# Patient Record
Sex: FEMALE | Race: WHITE | NOT HISPANIC OR LATINO | Employment: OTHER | URBAN - METROPOLITAN AREA
[De-identification: names, ages, dates, MRNs, and addresses within clinical notes are randomized per-mention and may not be internally consistent; named-entity substitution may affect disease eponyms.]

---

## 2021-04-20 ENCOUNTER — TRANSCRIBE ORDERS (OUTPATIENT)
Dept: ADMINISTRATIVE | Facility: HOSPITAL | Age: 51
End: 2021-04-20

## 2021-04-20 DIAGNOSIS — R10.12 ABDOMINAL PAIN, LEFT UPPER QUADRANT: Primary | ICD-10-CM

## 2021-05-06 ENCOUNTER — HOSPITAL ENCOUNTER (OUTPATIENT)
Dept: ULTRASOUND IMAGING | Facility: HOSPITAL | Age: 51
Discharge: HOME | End: 2021-05-06
Admitting: NURSE PRACTITIONER

## 2021-05-06 DIAGNOSIS — R10.12 ABDOMINAL PAIN, LEFT UPPER QUADRANT: ICD-10-CM

## 2021-05-06 PROCEDURE — 76700 US EXAM ABDOM COMPLETE: CPT

## 2021-05-13 ENCOUNTER — OFFICE VISIT (OUTPATIENT)
Dept: OBSTETRICS AND GYNECOLOGY | Facility: CLINIC | Age: 51
End: 2021-05-13

## 2021-05-13 VITALS
BODY MASS INDEX: 37.15 KG/M2 | SYSTOLIC BLOOD PRESSURE: 130 MMHG | DIASTOLIC BLOOD PRESSURE: 88 MMHG | HEIGHT: 64 IN | WEIGHT: 217.6 LBS

## 2021-05-13 DIAGNOSIS — Z12.4 SCREENING FOR CERVICAL CANCER: ICD-10-CM

## 2021-05-13 DIAGNOSIS — R63.5 WEIGHT GAIN: ICD-10-CM

## 2021-05-13 DIAGNOSIS — R10.32 LEFT LOWER QUADRANT PAIN: ICD-10-CM

## 2021-05-13 DIAGNOSIS — N95.1 MENOPAUSAL SYMPTOMS: ICD-10-CM

## 2021-05-13 DIAGNOSIS — Z01.419 ENCOUNTER FOR GYNECOLOGICAL EXAMINATION WITHOUT ABNORMAL FINDING: Primary | ICD-10-CM

## 2021-05-13 DIAGNOSIS — Z12.31 ENCOUNTER FOR SCREENING MAMMOGRAM FOR MALIGNANT NEOPLASM OF BREAST: ICD-10-CM

## 2021-05-13 DIAGNOSIS — N92.1 MENORRHAGIA WITH IRREGULAR CYCLE: ICD-10-CM

## 2021-05-13 PROCEDURE — G0101 CA SCREEN;PELVIC/BREAST EXAM: HCPCS | Performed by: PHYSICIAN ASSISTANT

## 2021-05-13 RX ORDER — ERGOCALCIFEROL 1.25 MG/1
CAPSULE ORAL
COMMUNITY

## 2021-05-13 RX ORDER — METHOTREXATE 2.5 MG/1
5 TABLET ORAL WEEKLY
COMMUNITY
Start: 2021-02-25

## 2021-05-13 RX ORDER — CETIRIZINE HYDROCHLORIDE 10 MG/1
TABLET ORAL
COMMUNITY
End: 2021-06-30 | Stop reason: ALTCHOICE

## 2021-05-13 RX ORDER — ADALIMUMAB 40MG/0.8ML
40 KIT SUBCUTANEOUS
COMMUNITY

## 2021-05-13 RX ORDER — DIPHENOXYLATE HYDROCHLORIDE AND ATROPINE SULFATE 2.5; .025 MG/1; MG/1
1 TABLET ORAL DAILY
COMMUNITY

## 2021-05-13 RX ORDER — FOLIC ACID 1 MG/1
3 TABLET ORAL DAILY
COMMUNITY
Start: 2021-02-25

## 2021-05-13 NOTE — PROGRESS NOTES
Subjective   Chief Complaint   Patient presents with   • Gynecologic Exam     Last pap and MMG done 10 years ago, Patient would like to discuss Menopause       Abby Antoine is a 50 y.o. year old  presenting to be seen for her annual gynecological exam.   She is having menopausal symptoms. Hot flashes, night sweats and irregular menses for 1 year.   Reports intermittant left lower quadrant achy pain for 1 year also  LMP 21 for 8 days. 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    Past Medical History:   Diagnosis Date   • Abnormal Pap smear of cervix    • Anemia    • Anxiety    • Bipolar disorder (CMS/HCC)    • CHF (congestive heart failure) (CMS/HCC)    • Depression    • Gout    • Hyperlipidemia    • Kidney stone    • Liver disease    • Ovarian cyst    • PMS (premenstrual syndrome)    • Trauma    • Urinary tract infection         Current Outpatient Medications:   •  Adalimumab (Humira Pen) 40 MG/0.8ML Pen-injector Kit, 40 mg., Disp: , Rfl:   •  cetirizine (ZyrTEC Allergy) 10 MG tablet, Zyrtec 10 mg tablet  Daily, Disp: , Rfl:   •  ergocalciferol (ERGOCALCIFEROL) 1.25 MG (25116 UT) capsule, Vitamin D2 1,250 mcg (50,000 unit) capsule  TAKE 1 CAPSULE EVERY WEEK, Disp: , Rfl:   •  folic acid (FOLVITE) 1 MG tablet, , Disp: , Rfl:   •  methotrexate 2.5 MG tablet, , Disp: , Rfl:   •  multivitamin (Once Daily) tablet tablet, Take 1 tablet by mouth Daily., Disp: , Rfl:    Allergies   Allergen Reactions   • Latex Rash      Past Surgical History:   Procedure Laterality Date   • CERVIX LESION DESTRUCTION     • TUBAL ABDOMINAL LIGATION        Social History     Socioeconomic History   • Marital status:      Spouse name: Not on file   • Number of children: Not on file   • Years of education: Not on file  "  • Highest education level: Not on file   Tobacco Use   • Smoking status: Current Every Day Smoker   • Smokeless tobacco: Never Used   Vaping Use   • Vaping Use: Never used   Substance and Sexual Activity   • Alcohol use: Defer   • Drug use: Never   • Sexual activity: Yes     Partners: Male     Birth control/protection: Surgical      Family History   Problem Relation Age of Onset   • Coronary artery disease Father    • Hypertension Father    • Melanoma Father    • Stroke Maternal Grandmother    • Breast cancer Maternal Aunt    • Cervical cancer Maternal Aunt        Review of Systems   Constitutional: Positive for fatigue. Negative for chills, diaphoresis and fever.        Weight gain   Gastrointestinal: Negative.    Endocrine: Positive for heat intolerance.   Genitourinary: Positive for menstrual problem and pelvic pain. Negative for difficulty urinating and dysuria.   All other systems reviewed and are negative.          Objective   /88   Ht 162.6 cm (64\")   Wt 98.7 kg (217 lb 9.6 oz)   LMP 04/20/2021 (Exact Date)   Breastfeeding No   BMI 37.35 kg/m²     Physical Exam  Constitutional:       Appearance: Normal appearance. She is well-developed and well-groomed. She is obese.   Eyes:      General: Lids are normal.      Extraocular Movements: Extraocular movements intact.      Conjunctiva/sclera: Conjunctivae normal.   Chest:      Breasts: Breasts are symmetrical.         Right: No inverted nipple, mass, nipple discharge, skin change or tenderness.         Left: No inverted nipple, mass, nipple discharge, skin change or tenderness.   Abdominal:      Palpations: Abdomen is soft.      Tenderness: There is no abdominal tenderness.   Genitourinary:     Labia:         Right: No rash, tenderness or lesion.         Left: No rash, tenderness or lesion.       Urethra: No prolapse, urethral pain, urethral swelling or urethral lesion.      Vagina: No vaginal discharge, tenderness or lesions.      Cervix: No cervical " motion tenderness, discharge, friability or lesion.      Uterus: Not enlarged and not tender.       Adnexa:         Right: No mass or tenderness.          Left: No mass or tenderness.     Skin:     General: Skin is warm and dry.      Findings: No bruising or lesion.   Neurological:      General: No focal deficit present.      Mental Status: She is alert and oriented to person, place, and time.   Psychiatric:         Attention and Perception: Attention normal.         Mood and Affect: Mood normal.         Speech: Speech normal.         Behavior: Behavior is cooperative.            Result Review :{Labs  Result Review  Imaging  Med Tab  Media :23}                   Assessment and Plan  Diagnoses and all orders for this visit:    1. Encounter for gynecological examination without abnormal finding (Primary)    2. Screening for cervical cancer  -     Pap IG, Rfx HPV ASCU; Future    3. Encounter for screening mammogram for malignant neoplasm of breast  -     Mammo Screening Digital Tomosynthesis Bilateral With CAD; Future    4. Menopausal symptoms  -     Estradiol  -     Follicle Stimulating Hormone  -     Luteinizing Hormone  -     Progesterone    5. Weight gain    6. Menorrhagia with irregular cycle  -     US Non-ob Transvaginal    7. Left lower quadrant pain  -     US Non-ob Transvaginal      Patient Instructions   Self breast exam monthly  Annual mammogram  Regular excercise  Will check hormone levels today  Request labs from PCP  RTO for pelvic ultrasound             This note was electronically signed.    Sana Canas PA-C   May 13, 2021

## 2021-05-13 NOTE — PATIENT INSTRUCTIONS
Self breast exam monthly  Annual mammogram  Regular excercise  Will check hormone levels today  Request labs from PCP  RTO for pelvic ultrasound

## 2021-05-14 LAB
ESTRADIOL SERPL-MCNC: 28 PG/ML
FSH SERPL-ACNC: 59.5 MIU/ML
LH SERPL-ACNC: 39.4 MIU/ML
PROGEST SERPL-MCNC: <0.1 NG/ML

## 2021-05-21 DIAGNOSIS — Z12.4 SCREENING FOR CERVICAL CANCER: ICD-10-CM

## 2021-06-08 ENCOUNTER — DOCUMENTATION (OUTPATIENT)
Dept: OBSTETRICS AND GYNECOLOGY | Facility: CLINIC | Age: 51
End: 2021-06-08

## 2021-06-30 ENCOUNTER — OFFICE VISIT (OUTPATIENT)
Dept: GASTROENTEROLOGY | Facility: CLINIC | Age: 51
End: 2021-06-30

## 2021-06-30 VITALS
TEMPERATURE: 97.8 F | DIASTOLIC BLOOD PRESSURE: 89 MMHG | WEIGHT: 210 LBS | HEIGHT: 64 IN | HEART RATE: 76 BPM | BODY MASS INDEX: 35.85 KG/M2 | SYSTOLIC BLOOD PRESSURE: 131 MMHG

## 2021-06-30 DIAGNOSIS — E78.2 ELEVATED TRIGLYCERIDES WITH HIGH CHOLESTEROL: Chronic | ICD-10-CM

## 2021-06-30 DIAGNOSIS — K76.0 FATTY (CHANGE OF) LIVER, NOT ELSEWHERE CLASSIFIED: Primary | Chronic | ICD-10-CM

## 2021-06-30 DIAGNOSIS — E66.01 CLASS 2 SEVERE OBESITY DUE TO EXCESS CALORIES WITH SERIOUS COMORBIDITY AND BODY MASS INDEX (BMI) OF 36.0 TO 36.9 IN ADULT (HCC): Chronic | ICD-10-CM

## 2021-06-30 DIAGNOSIS — K21.9 GASTROESOPHAGEAL REFLUX DISEASE WITHOUT ESOPHAGITIS: Chronic | ICD-10-CM

## 2021-06-30 DIAGNOSIS — R10.9 LEFT FLANK PAIN: ICD-10-CM

## 2021-06-30 DIAGNOSIS — K80.20 CALCULUS OF GALLBLADDER WITHOUT CHOLECYSTITIS WITHOUT OBSTRUCTION: Chronic | ICD-10-CM

## 2021-06-30 DIAGNOSIS — Z92.21 HISTORY OF METHOTREXATE THERAPY: Chronic | ICD-10-CM

## 2021-06-30 DIAGNOSIS — L29.9 PRURITUS: Chronic | ICD-10-CM

## 2021-06-30 PROBLEM — F17.200 TOBACCO DEPENDENCE SYNDROME: Status: ACTIVE | Noted: 2018-05-03

## 2021-06-30 PROBLEM — B19.20 VIRAL HEPATITIS C: Status: ACTIVE | Noted: 2018-05-03

## 2021-06-30 PROBLEM — R09.82 ALLERGIC RHINITIS WITH POSTNASAL DRIP: Status: ACTIVE | Noted: 2018-10-13

## 2021-06-30 PROBLEM — B19.20 VIRAL HEPATITIS C: Status: RESOLVED | Noted: 2018-05-03 | Resolved: 2021-06-30

## 2021-06-30 PROBLEM — R60.9 SWELLING: Status: ACTIVE | Noted: 2018-05-03

## 2021-06-30 PROBLEM — E66.812 CLASS 2 SEVERE OBESITY DUE TO EXCESS CALORIES WITH SERIOUS COMORBIDITY AND BODY MASS INDEX (BMI) OF 36.0 TO 36.9 IN ADULT: Status: ACTIVE | Noted: 2021-06-30

## 2021-06-30 PROBLEM — Z87.39 HISTORY OF RHEUMATOID ARTHRITIS: Status: ACTIVE | Noted: 2018-10-13

## 2021-06-30 PROBLEM — J30.9 ALLERGIC RHINITIS WITH POSTNASAL DRIP: Status: ACTIVE | Noted: 2018-10-13

## 2021-06-30 PROBLEM — Z87.01 HISTORY OF PNEUMONIA: Status: ACTIVE | Noted: 2018-10-13

## 2021-06-30 PROCEDURE — 99204 OFFICE O/P NEW MOD 45 MIN: CPT | Performed by: NURSE PRACTITIONER

## 2021-06-30 RX ORDER — LORATADINE 10 MG/1
10 TABLET ORAL DAILY
COMMUNITY

## 2021-06-30 NOTE — PROGRESS NOTES
"     New Patient Consult      Date: 2021   Patient Name: Abby Antoine  MRN: [id number]  : 1970     Primary Care Provider: Abby Manning APRN    Chief Complaint   Patient presents with   • Liver Eval     Fatty liver     History of Present Illness: Abby Antoine is a 50 y.o. female who is here today to establish care with Gastroenterology for fatty liver.    Patient recently had ultrasound and was told to have fatty liver. She is not aware of liver disease in the past. No history of elevated liver enzymes. She has RA and has labs drawn every 3 months by rheumatology as she is on Methotrexate. She admits she has gained weight, about 60 pounds, over the past year, but is trying to lose it. For the past 6 months she has been having itching \"all over\" that is worse at night when she goes to bed. No history of rashes. No history of jaundice or pale stools. Her uncle has a history of cirrhosis that was alcohol induced.     She has pain in her left side that is gradually improving, it feels like a \"catch\" when she moves certain ways. No abdominal pain. No change in bowel habits. She has a long standing history of loose stools that will come and go.     She has occasional reflux that will come and go and is mild to moderate. She is not taking anything for reflux. No difficulty swallowing. No nausea or vomiting.     Her last colonoscopy and EGD was  that was \"normal\" according to patient. No family history of GI malignancy.     Subjective      Past Medical History:   Diagnosis Date   • Abnormal Pap smear of cervix    • Acid reflux    • Anemia    • Anxiety    • Back pain    • Bipolar disorder (CMS/HCC)    • CHF (congestive heart failure) (CMS/HCC) 2017   • Deafness    • Depression    • Fibromyalgia    • Folic acid deficiency    • Gout    • Headache    • History of blood transfusion     at birth   • HTN (hypertension)    • Hyperlipidemia    • Insomnia    • Iron deficiency    • Kidney stone    • Liver " disease    • Ovarian cyst    • Panic attack    • PMS (premenstrual syndrome)    • RA (rheumatoid arthritis) (CMS/HCC)    • RLS (restless legs syndrome)    • Seasonal allergies    • Sinus problem    • Sleep apnea    • Tattoos    • Trauma    • Urinary tract infection    • Vision problems    • Vitamin B12 deficiency      Past Surgical History:   Procedure Laterality Date   • CERVIX LESION DESTRUCTION     • COLONOSCOPY  8- years or so    [facility]   • TUBAL ABDOMINAL LIGATION  1994     Family History   Problem Relation Age of Onset   • Coronary artery disease Father    • Hypertension Father    • Melanoma Father    • Stroke Maternal Grandmother    • Breast cancer Maternal Aunt    • Cervical cancer Maternal Aunt    • Liver cancer Paternal Aunt    • Inflammatory bowel disease Paternal Aunt    • Liver cancer Paternal Uncle    • Liver cancer Paternal Grandfather    • Cirrhosis Maternal Uncle         alcohol induced   • Colon cancer Neg Hx      Social History     Socioeconomic History   • Marital status:      Spouse name: Not on file   • Number of children: Not on file   • Years of education: Not on file   • Highest education level: Not on file   Tobacco Use   • Smoking status: Current Every Day Smoker     Packs/day: 0.25     Types: Cigarettes     Start date: 1997   • Smokeless tobacco: Never Used   Vaping Use   • Vaping Use: Never used   Substance and Sexual Activity   • Alcohol use: Yes     Comment: social   • Drug use: Never   • Sexual activity: Defer       Current Outpatient Medications:   •  Adalimumab (Humira Pen) 40 MG/0.8ML Pen-injector Kit, 40 mg., Disp: , Rfl:   •  ergocalciferol (ERGOCALCIFEROL) 1.25 MG (02799 UT) capsule, Vitamin D2 1,250 mcg (50,000 unit) capsule  TAKE 1 CAPSULE EVERY WEEK, Disp: , Rfl:   •  Fluconazole (DIFLUCAN PO), Take  by mouth 1 (One) Time., Disp: , Rfl:   •  folic acid (FOLVITE) 1 MG tablet, Take 3 mg by mouth Daily., Disp: , Rfl:   •  loratadine (Claritin) 10 MG  tablet, Take 10 mg by mouth Daily., Disp: , Rfl:   •  methotrexate 2.5 MG tablet, Take 5 mg by mouth 1 (One) Time Per Week., Disp: , Rfl:   •  multivitamin (Once Daily) tablet tablet, Take 1 tablet by mouth Daily., Disp: , Rfl:     Allergies   Allergen Reactions   • Latex Rash     The following portions of the patient's history were reviewed and updated as appropriate: allergies, current medications, past family history, past medical history, past social history, past surgical history and problem list.    Objective     Physical Exam  Vitals and nursing note reviewed.   Constitutional:       General: She is not in acute distress.     Appearance: Normal appearance. She is well-developed. She is not diaphoretic.   HENT:      Head: Normocephalic and atraumatic.      Right Ear: Hearing and external ear normal.      Left Ear: Hearing and external ear normal.      Nose: Nose normal.      Mouth/Throat:      Mouth: Mucous membranes are not pale, not dry and not cyanotic.   Eyes:      General: Lids are normal.         Right eye: No discharge.         Left eye: No discharge.      Conjunctiva/sclera: Conjunctivae normal.   Neck:      Trachea: Trachea normal.   Cardiovascular:      Rate and Rhythm: Normal rate and regular rhythm.      Heart sounds: Normal heart sounds.   Pulmonary:      Effort: Pulmonary effort is normal. No respiratory distress.      Breath sounds: Normal breath sounds.   Chest:      Chest wall: No tenderness.       Abdominal:      General: Bowel sounds are normal. There is no distension.      Palpations: Abdomen is soft. There is no mass.      Tenderness: There is no abdominal tenderness. There is no guarding or rebound.      Hernia: No hernia is present.   Musculoskeletal:      Cervical back: No edema.   Skin:     General: Skin is warm and dry.      Coloration: Skin is not pale.      Findings: No rash.   Neurological:      Mental Status: She is alert and oriented to person, place, and time.      Cranial  "Nerves: No cranial nerve deficit.   Psychiatric:         Mood and Affect: Mood normal.         Speech: Speech normal.         Behavior: Behavior is cooperative.       Vitals:    06/30/21 1411   BP: 131/89   Pulse: 76   Temp: 97.8 °F (36.6 °C)   TempSrc: Infrared   Weight: 95.3 kg (210 lb)   Height: 162.6 cm (64\")     Results Review:   I have reviewed the patient's new clinical and imaging results.    Office Visit on 05/13/2021   Component Date Value Ref Range Status   • Estradiol 05/13/2021 28.0  pg/mL Final    Comment:                     Adult Female:                        Follicular phase   12.5 -   166.0                        Ovulation phase    85.8 -   498.0                        Luteal phase       43.8 -   211.0                        Postmenopausal     <6.0 -    54.7                      Pregnancy                        1st trimester     215.0 - >4300.0  Roche ECLIA methodology     • FSH 05/13/2021 59.5  mIU/mL Final    Comment:                     Adult Female:                        Follicular phase      3.5 -  12.5                        Ovulation phase       4.7 -  21.5                        Luteal phase          1.7 -   7.7                        Postmenopausal       25.8 - 134.8     • LH 05/13/2021 39.4  mIU/mL Final    Comment:                     Adult Female:                        Follicular phase      2.4 -  12.6                        Ovulation phase      14.0 -  95.6                        Luteal phase          1.0 -  11.4                        Postmenopausal        7.7 -  58.5     • Progesterone 05/13/2021 <0.1  ng/mL Final    Comment:                      Follicular phase       0.1 -   0.9                       Luteal phase           1.8 -  23.9                       Ovulation phase        0.1 -  12.0                       Pregnant                          First trimester    11.0 -  44.3                          Second trimester   25.4 -  83.3                          Third trimester    " 58.7 - 214.0                       Postmenopausal         0.0 -   0.1        US Abdomen Complete      Result Date: 5/6/2021  1. Cholelithiasis. 2. Fatty infiltration of the liver. 3. Right kidney not visualized.      Images were reviewed, interpreted, and dictated by Dr. Tracy Gillette M.D. Transcribed by Jolie Sultana PA-C.  This report was finalized on 5/6/2021 3:01 PM by Tracy Gillette M.D..     Dated 4/1/2021 albumin 4.0 alkaline phosphatase 117 ALT 18 AST 24 total bilirubin 0.3, lipase 21, amylase 75, TSH 1.570, triglycerides 370, total cholesterol 261, , HDL 47    Assessment / Plan      1. Fatty (change of) liver, not elsewhere classified  2. Class 2 severe obesity due to excess calories with serious comorbidity and body mass index (BMI) of 36.0 to 36.9 in adult (CMS/HCC)  3. Elevated triglycerides with high cholesterol  4. Pruritus  5. History of methotrexate therapy  Patient recently had abdominal ultrasound with incidental finding of fatty infiltration of liver noted.  No history of elevated liver enzymes.  No personal or family history of liver disease in the past.  Her uncle has a history of cirrhosis that was alcohol induced.  According to records, triglycerides are elevated at 370, total cholesterol at 261, , HDL 47.  She has a history of pruritus for the past 6 months, no rash.  No history of jaundice or pale stools.  She is on methotrexate for RA. She has gained 60 pounds over the past year unintentionally.  High-fiber, low-fat diet with liberal water intake  Exercise, weight reduction  Labs  Referral to nutrition services    - CBC (No Diff); Future  - Comprehensive Metabolic Panel; Future  - Hepatitis A Antibody, Total; Future  - Hepatitis B Surf Antibody Quant; Future  - Hepatitis B Surface Antigen; Future  - Hepatitis B Core Antibody, Total; Future  - Hepatitis C Antibody; Future  - Antinuclear Antibodies Direct; Future  - Mitochondrial Antibodies, M2; Future  - Anti-Smooth  Muscle Antibody Titer; Future  - FRANCISCO Fibrosure; Future  - Ambulatory Referral to Nutrition Services    6. Gastroesophageal reflux disease without esophagitis  History of occasional reflux that comes and goes that is mild to moderate depending on her diet.  She is not taking anything for reflux at this time as it is not severe.  No difficulty swallowing.  Antireflux measures.  May take over-the-counter antacids as needed    7. Calculus of gallbladder without cholecystitis without obstruction  Incidental finding of gallstones on recent ultrasound.  Patient states she has had a history of gallstones for several years.  There is no history of abdominal pain, nausea or vomiting.  She has occasional loose stools, no rectal bleeding.  Will monitor at this time as patient is not symptomatic.    8. Left flank pain  She has a history of pain in her left side that is gradually improving.  It feels like something is catching when she moves in certain positions.  No abdominal pain.  No nausea or vomiting.  No hematuria. Tenderness noted over lower left ribs on the side. TSH normal. Lipase normal.  Etiology unclear.  May need further evaluation if no improvement.    Patient Instructions   High fiber, low fat diet with liberal water intake.   Advised to exercise 30 minutes 4-5 days per week.  Advised to lose 20 pounds in the next 6-12 months.  Antireflux measures: Avoid fried, fatty foods, alcohol, chocolate, coffee, tea,  soft drinks, peppermint and spearmint, spicy foods, tomatoes and tomato based foods, onion based foods, and smoking. Other antireflux measures include weight reduction if overweight, avoiding tight clothing around the abdomen, elevating the head of the bed 6 inches with blocks under the head board, and don't drink or eat before going to bed and avoid lying down immediately after meals.  May take over the counter antacids as needed.  Labs  Referral to nutrition services.   Follow up: 3 months  Mediterranean  Diet  A Mediterranean diet refers to food and lifestyle choices that are based on the traditions of countries located on the [place]. This way of eating has been shown to help prevent certain conditions and improve outcomes for people who have chronic diseases, like kidney disease and heart disease.  What are tips for following this plan?  Lifestyle  · Cook and eat meals together with your family, when possible.  · Drink enough fluid to keep your urine clear or pale yellow.  · Be physically active every day. This includes:  ? Aerobic exercise like running or swimming.  ? Leisure activities like gardening, walking, or housework.  · Get 7-8 hours of sleep each night.  Reading food labels    · Check the serving size of packaged foods. For foods such as rice and pasta, the serving size refers to the amount of cooked product, not dry.  · Check the total fat in packaged foods. Avoid foods that have saturated fat or trans fats.  · Check the ingredients list for added sugars, such as corn syrup.  Shopping  · At the grocery store, buy most of your food from the areas near the walls of the store. This includes:  ? Fresh fruits and vegetables (produce).  ? Grains, beans, nuts, and seeds. Some of these may be available in unpackaged forms or large amounts (in bulk).  ? Fresh seafood.  ? Poultry and eggs.  ? Low-fat dairy products.  · Buy whole ingredients instead of prepackaged foods.  · Buy fresh fruits and vegetables in-season from local farmers markets.  · Buy frozen fruits and vegetables in resealable bags.  · If you do not have access to quality fresh seafood, buy precooked frozen shrimp or canned fish, such as tuna, salmon, or sardines.  · Buy small amounts of raw or cooked vegetables, salads, or olives from the deli or salad bar at your store.  · Stock your pantry so you always have certain foods on hand, such as olive oil, canned tuna, canned tomatoes, rice, pasta, and beans.  Cooking  · Cook foods with  extra-virgin olive oil instead of using butter or other vegetable oils.  · Have meat as a side dish, and have vegetables or grains as your main dish. This means having meat in small portions or adding small amounts of meat to foods like pasta or stew.  · Use beans or vegetables instead of meat in common dishes like chili or lasagna.  · [place] with different cooking methods. Try roasting or broiling vegetables instead of steaming or sautéeing them.  · Add frozen vegetables to soups, stews, pasta, or rice.  · Add nuts or seeds for added healthy fat at each meal. You can add these to yogurt, salads, or vegetable dishes.  · Marinate fish or vegetables using olive oil, lemon juice, garlic, and fresh herbs.  Meal planning    · Plan to eat 1 vegetarian meal one day each week. Try to work up to 2 vegetarian meals, if possible.  · Eat seafood 2 or more times a week.  · Have healthy snacks readily available, such as:  ? Vegetable sticks with hummus.  ? Greek yogurt.  ? Fruit and nut trail mix.  · Eat balanced meals throughout the week. This includes:  ? Fruit: 2-3 servings a day  ? Vegetables: 4-5 servings a day  ? Low-fat dairy: 2 servings a day  ? Fish, poultry, or lean meat: 1 serving a day  ? Beans and legumes: 2 or more servings a week  ? Nuts and seeds: 1-2 servings a day  ? Whole grains: 6-8 servings a day  ? Extra-virgin olive oil: 3-4 servings a day  · Limit red meat and sweets to only a few servings a month  What are my food choices?  · Mediterranean diet  ? Recommended  § Grains: Whole-grain pasta. Brown rice. Bulgar wheat. Polenta. Couscous. Whole-wheat bread. Oatmeal. Quinoa.  § Vegetables: Artichokes. Beets. Broccoli. Cabbage. Carrots. Eggplant. Green beans. Chard. Kale. Spinach. Onions. Leeks. Peas. Squash. Tomatoes. Peppers. Radishes.  § Fruits: Apples. Apricots. Avocado. Berries. Bananas. Cherries. Dates. Figs. Grapes. Ronal[place]. Melon. Oranges. Peaches. Plums. Pomegranate.  § Meats and other protein foods:  Beans. Almonds. Sunflower seeds. Pine nuts. Peanuts. Cod. [place]. Scallops. Shrimp. Tuna. Tilapia. Clams. Oysters. Eggs.  § Dairy: Low-fat milk. Cheese. Greek yogurt.  § Beverages: Water. Red wine. Herbal tea.  § Fats and oils: Extra virgin olive oil. Avocado oil. Grape seed oil.  § Sweets and desserts: Greek yogurt with honey. Baked apples. Poached pears. Trail mix.  § Seasoning and other foods: Basil. Cilantro. Coriander. Cumin. Mint. Parsley. Andre. Rosemary. Tarragon. Garlic. Oregano. Thyme. Pepper. Balsalmic vinegar. Tahini. Hummus. Tomato sauce. Olives. Mushrooms.  ? Limit these  § Grains: Prepackaged pasta or rice dishes. Prepackaged cereal with added sugar.  § Vegetables: Deep fried potatoes (french fries).  § Fruits: Fruit canned in syrup.  § Meats and other protein foods: Beef. Pork. Lamb. Poultry with skin. Hot dogs. [place].  § Dairy: Ice cream. Sour cream. Whole milk.  § Beverages: Juice. Sugar-sweetened soft drinks. Beer. Liquor and spirits.  § Fats and oils: Butter. Canola oil. Vegetable oil. Beef fat (tallow). Lard.  § Sweets and desserts: Cookies. Cakes. Pies. Candy.  § Seasoning and other foods: Mayonnaise. Premade sauces and marinades.  The items listed may not be a complete list. Talk with your dietitian about what dietary choices are right for you.  Summary  · The Mediterranean diet includes both food and lifestyle choices.  · Eat a variety of fresh fruits and vegetables, beans, nuts, seeds, and whole grains.  · Limit the amount of red meat and sweets that you eat.  This information is not intended to replace advice given to you by your health care provider. Make sure you discuss any questions you have with your health care provider.  Document Revised: 08/17/2017 Document Reviewed: 08/10/2017  [employer] Patient Education © 2020 [employer]     Brayan Duran, APRN  6/30/2021    Please note that portions of this note may have been completed with a voice recognition program. Efforts were made to  edit the dictations, but occasionally words are mistranscribed.

## 2021-06-30 NOTE — PATIENT INSTRUCTIONS
High fiber, low fat diet with liberal water intake.   Advised to exercise 30 minutes 4-5 days per week.  Advised to lose 20 pounds in the next 6-12 months.  Antireflux measures: Avoid fried, fatty foods, alcohol, chocolate, coffee, tea,  soft drinks, peppermint and spearmint, spicy foods, tomatoes and tomato based foods, onion based foods, and smoking. Other antireflux measures include weight reduction if overweight, avoiding tight clothing around the abdomen, elevating the head of the bed 6 inches with blocks under the head board, and don't drink or eat before going to bed and avoid lying down immediately after meals.  May take over the counter antacids as needed.  Labs  Referral to nutrition services.   Follow up: 3 months  Mediterranean Diet  A Mediterranean diet refers to food and lifestyle choices that are based on the traditions of countries located on the [place]. This way of eating has been shown to help prevent certain conditions and improve outcomes for people who have chronic diseases, like kidney disease and heart disease.  What are tips for following this plan?  Lifestyle  · Cook and eat meals together with your family, when possible.  · Drink enough fluid to keep your urine clear or pale yellow.  · Be physically active every day. This includes:  ? Aerobic exercise like running or swimming.  ? Leisure activities like gardening, walking, or housework.  · Get 7-8 hours of sleep each night.  Reading food labels    · Check the serving size of packaged foods. For foods such as rice and pasta, the serving size refers to the amount of cooked product, not dry.  · Check the total fat in packaged foods. Avoid foods that have saturated fat or trans fats.  · Check the ingredients list for added sugars, such as corn syrup.  Shopping  · At the grocery store, buy most of your food from the areas near the walls of the store. This includes:  ? Fresh fruits and vegetables (produce).  ? Grains, beans, nuts, and  seeds. Some of these may be available in unpackaged forms or large amounts (in bulk).  ? Fresh seafood.  ? Poultry and eggs.  ? Low-fat dairy products.  · Buy whole ingredients instead of prepackaged foods.  · Buy fresh fruits and vegetables in-season from local farmers markets.  · Buy frozen fruits and vegetables in resealable bags.  · If you do not have access to quality fresh seafood, buy precooked frozen shrimp or canned fish, such as tuna, salmon, or sardines.  · Buy small amounts of raw or cooked vegetables, salads, or olives from the deli or salad bar at your store.  · Stock your pantry so you always have certain foods on hand, such as olive oil, canned tuna, canned tomatoes, rice, pasta, and beans.  Cooking  · Cook foods with extra-virgin olive oil instead of using butter or other vegetable oils.  · Have meat as a side dish, and have vegetables or grains as your main dish. This means having meat in small portions or adding small amounts of meat to foods like pasta or stew.  · Use beans or vegetables instead of meat in common dishes like chili or lasagna.  · [place] with different cooking methods. Try roasting or broiling vegetables instead of steaming or sautéeing them.  · Add frozen vegetables to soups, stews, pasta, or rice.  · Add nuts or seeds for added healthy fat at each meal. You can add these to yogurt, salads, or vegetable dishes.  · Marinate fish or vegetables using olive oil, lemon juice, garlic, and fresh herbs.  Meal planning    · Plan to eat 1 vegetarian meal one day each week. Try to work up to 2 vegetarian meals, if possible.  · Eat seafood 2 or more times a week.  · Have healthy snacks readily available, such as:  ? Vegetable sticks with hummus.  ? Greek yogurt.  ? Fruit and nut trail mix.  · Eat balanced meals throughout the week. This includes:  ? Fruit: 2-3 servings a day  ? Vegetables: 4-5 servings a day  ? Low-fat dairy: 2 servings a day  ? Fish, poultry, or lean meat: 1 serving a  day  ? Beans and legumes: 2 or more servings a week  ? Nuts and seeds: 1-2 servings a day  ? Whole grains: 6-8 servings a day  ? Extra-virgin olive oil: 3-4 servings a day  · Limit red meat and sweets to only a few servings a month  What are my food choices?  · Mediterranean diet  ? Recommended  § Grains: Whole-grain pasta. Brown rice. Bulgar wheat. Polenta. Couscous. Whole-wheat bread. Oatmeal. Quinoa.  § Vegetables: Artichokes. Beets. Broccoli. Cabbage. Carrots. Eggplant. Green beans. Chard. Kale. Spinach. Onions. Leeks. Peas. Squash. Tomatoes. Peppers. Radishes.  § Fruits: Apples. Apricots. Avocado. Berries. Bananas. Cherries. Dates. Figs. Grapes. Ronaldo. Melon. Oranges. Peaches. Plums. Pomegranate.  § Meats and other protein foods: Beans. Almonds. Sunflower seeds. Pine nuts. Peanuts. Cod. [place]. Scallops. Shrimp. Tuna. Tilapia. Clams. Oysters. Eggs.  § Dairy: Low-fat milk. Cheese. Greek yogurt.  § Beverages: Water. Red wine. Herbal tea.  § Fats and oils: Extra virgin olive oil. Avocado oil. Grape seed oil.  § Sweets and desserts: Greek yogurt with honey. Baked apples. Poached pears. Trail mix.  § Seasoning and other foods: Basil. Cilantro. Coriander. Cumin. Mint. Parsley. Andre. Rosemary. Tarragon. Garlic. Oregano. Thyme. Pepper. Balsalmic vinegar. Tahini. Hummus. Tomato sauce. Olives. Mushrooms.  ? Limit these  § Grains: Prepackaged pasta or rice dishes. Prepackaged cereal with added sugar.  § Vegetables: Deep fried potatoes (french fries).  § Fruits: Fruit canned in syrup.  § Meats and other protein foods: Beef. Pork. Lamb. Poultry with skin. Hot dogs. Portillo.  § Dairy: Ice cream. Sour cream. Whole milk.  § Beverages: Juice. Sugar-sweetened soft drinks. Beer. Liquor and spirits.  § Fats and oils: Butter. Canola oil. Vegetable oil. Beef fat (tallow). Lard.  § Sweets and desserts: Cookies. Cakes. Pies. Candy.  § Seasoning and other foods: Mayonnaise. Premade sauces and marinades.  The items listed may not be a  complete list. Talk with your dietitian about what dietary choices are right for you.  Summary  · The Mediterranean diet includes both food and lifestyle choices.  · Eat a variety of fresh fruits and vegetables, beans, nuts, seeds, and whole grains.  · Limit the amount of red meat and sweets that you eat.  This information is not intended to replace advice given to you by your health care provider. Make sure you discuss any questions you have with your health care provider.  Document Revised: 08/17/2017 Document Reviewed: 08/10/2017  Elsevier Patient Education © 2020 Elsevier Inc.

## 2021-09-17 ENCOUNTER — TELEPHONE (OUTPATIENT)
Dept: GASTROENTEROLOGY | Facility: CLINIC | Age: 51
End: 2021-09-17

## 2021-09-17 NOTE — TELEPHONE ENCOUNTER
1st attempt: Called patient regarding overdue results. States that she will have labs prior to follow up visit scheduled for next week

## 2022-03-08 ENCOUNTER — APPOINTMENT (OUTPATIENT)
Dept: CT IMAGING | Facility: HOSPITAL | Age: 52
End: 2022-03-08

## 2022-03-08 ENCOUNTER — HOSPITAL ENCOUNTER (OUTPATIENT)
Facility: HOSPITAL | Age: 52
Discharge: HOME | End: 2022-03-10
Attending: EMERGENCY MEDICINE | Admitting: UROLOGY

## 2022-03-08 DIAGNOSIS — N23 RENAL COLIC ON LEFT SIDE: ICD-10-CM

## 2022-03-08 DIAGNOSIS — R11.2 NON-INTRACTABLE VOMITING WITH NAUSEA, UNSPECIFIED VOMITING TYPE: ICD-10-CM

## 2022-03-08 DIAGNOSIS — N13.2 URETERAL STONE WITH HYDRONEPHROSIS: Primary | ICD-10-CM

## 2022-03-08 LAB
ALBUMIN SERPL-MCNC: 4.2 G/DL (ref 3.5–5.2)
ALBUMIN/GLOB SERPL: 1.6 G/DL
ALP SERPL-CCNC: 89 U/L (ref 39–117)
ALT SERPL W P-5'-P-CCNC: 13 U/L (ref 1–33)
ANION GAP SERPL CALCULATED.3IONS-SCNC: 16.2 MMOL/L (ref 5–15)
AST SERPL-CCNC: 20 U/L (ref 1–32)
BACTERIA UR QL AUTO: ABNORMAL /HPF
BASOPHILS # BLD AUTO: 0.06 10*3/MM3 (ref 0–0.2)
BASOPHILS NFR BLD AUTO: 0.6 % (ref 0–1.5)
BILIRUB SERPL-MCNC: 0.3 MG/DL (ref 0–1.2)
BILIRUB UR QL STRIP: NEGATIVE
BUN SERPL-MCNC: 11 MG/DL (ref 6–20)
BUN/CREAT SERPL: 11.5 (ref 7–25)
CALCIUM SPEC-SCNC: 8.5 MG/DL (ref 8.6–10.5)
CHLORIDE SERPL-SCNC: 99 MMOL/L (ref 98–107)
CLARITY UR: CLEAR
CO2 SERPL-SCNC: 17.8 MMOL/L (ref 22–29)
COLOR UR: YELLOW
CREAT SERPL-MCNC: 0.96 MG/DL (ref 0.57–1)
DEPRECATED RDW RBC AUTO: 42.5 FL (ref 37–54)
EGFRCR SERPLBLD CKD-EPI 2021: 72.7 ML/MIN/1.73
EOSINOPHIL # BLD AUTO: 0.3 10*3/MM3 (ref 0–0.4)
EOSINOPHIL NFR BLD AUTO: 3.2 % (ref 0.3–6.2)
ERYTHROCYTE [DISTWIDTH] IN BLOOD BY AUTOMATED COUNT: 14.5 % (ref 12.3–15.4)
GLOBULIN UR ELPH-MCNC: 2.7 GM/DL
GLUCOSE SERPL-MCNC: 133 MG/DL (ref 65–99)
GLUCOSE UR STRIP-MCNC: NEGATIVE MG/DL
HCT VFR BLD AUTO: 41.1 % (ref 34–46.6)
HGB BLD-MCNC: 14.1 G/DL (ref 12–15.9)
HGB UR QL STRIP.AUTO: ABNORMAL
HYALINE CASTS UR QL AUTO: ABNORMAL /LPF
IMM GRANULOCYTES # BLD AUTO: 0.05 10*3/MM3 (ref 0–0.05)
IMM GRANULOCYTES NFR BLD AUTO: 0.5 % (ref 0–0.5)
KETONES UR QL STRIP: NEGATIVE
LEUKOCYTE ESTERASE UR QL STRIP.AUTO: NEGATIVE
LIPASE SERPL-CCNC: 11 U/L (ref 13–60)
LYMPHOCYTES # BLD AUTO: 2.69 10*3/MM3 (ref 0.7–3.1)
LYMPHOCYTES NFR BLD AUTO: 28.8 % (ref 19.6–45.3)
MCH RBC QN AUTO: 28.1 PG (ref 26.6–33)
MCHC RBC AUTO-ENTMCNC: 34.3 G/DL (ref 31.5–35.7)
MCV RBC AUTO: 81.9 FL (ref 79–97)
MONOCYTES # BLD AUTO: 0.78 10*3/MM3 (ref 0.1–0.9)
MONOCYTES NFR BLD AUTO: 8.3 % (ref 5–12)
NEUTROPHILS NFR BLD AUTO: 5.47 10*3/MM3 (ref 1.7–7)
NEUTROPHILS NFR BLD AUTO: 58.6 % (ref 42.7–76)
NITRITE UR QL STRIP: NEGATIVE
NRBC BLD AUTO-RTO: 0 /100 WBC (ref 0–0.2)
PH UR STRIP.AUTO: 5.5 [PH] (ref 5–8)
PLATELET # BLD AUTO: 283 10*3/MM3 (ref 140–450)
PMV BLD AUTO: 9.3 FL (ref 6–12)
POTASSIUM SERPL-SCNC: 3.8 MMOL/L (ref 3.5–5.2)
PROT SERPL-MCNC: 6.9 G/DL (ref 6–8.5)
PROT UR QL STRIP: NEGATIVE
RBC # BLD AUTO: 5.02 10*6/MM3 (ref 3.77–5.28)
RBC # UR STRIP: ABNORMAL /HPF
REF LAB TEST METHOD: ABNORMAL
SARS-COV-2 RNA PNL SPEC NAA+PROBE: NOT DETECTED
SODIUM SERPL-SCNC: 133 MMOL/L (ref 136–145)
SP GR UR STRIP: 1.01 (ref 1–1.03)
SQUAMOUS #/AREA URNS HPF: ABNORMAL /HPF
UROBILINOGEN UR QL STRIP: ABNORMAL
WBC # UR STRIP: ABNORMAL /HPF
WBC NRBC COR # BLD: 9.35 10*3/MM3 (ref 3.4–10.8)

## 2022-03-08 PROCEDURE — 83690 ASSAY OF LIPASE: CPT | Performed by: EMERGENCY MEDICINE

## 2022-03-08 PROCEDURE — 87635 SARS-COV-2 COVID-19 AMP PRB: CPT | Performed by: UROLOGY

## 2022-03-08 PROCEDURE — 99284 EMERGENCY DEPT VISIT MOD MDM: CPT

## 2022-03-08 PROCEDURE — 25010000002 HYDROMORPHONE 1 MG/ML SOLUTION: Performed by: EMERGENCY MEDICINE

## 2022-03-08 PROCEDURE — 25010000002 MORPHINE PER 10 MG: Performed by: EMERGENCY MEDICINE

## 2022-03-08 PROCEDURE — 96361 HYDRATE IV INFUSION ADD-ON: CPT

## 2022-03-08 PROCEDURE — 96375 TX/PRO/DX INJ NEW DRUG ADDON: CPT

## 2022-03-08 PROCEDURE — 99220 PR INITIAL OBSERVATION CARE/DAY 70 MINUTES: CPT | Performed by: FAMILY MEDICINE

## 2022-03-08 PROCEDURE — 80053 COMPREHEN METABOLIC PANEL: CPT | Performed by: EMERGENCY MEDICINE

## 2022-03-08 PROCEDURE — 81001 URINALYSIS AUTO W/SCOPE: CPT | Performed by: EMERGENCY MEDICINE

## 2022-03-08 PROCEDURE — 96376 TX/PRO/DX INJ SAME DRUG ADON: CPT

## 2022-03-08 PROCEDURE — 74176 CT ABD & PELVIS W/O CONTRAST: CPT

## 2022-03-08 PROCEDURE — 25010000002 ONDANSETRON PER 1 MG: Performed by: FAMILY MEDICINE

## 2022-03-08 PROCEDURE — 0 CEFAZOLIN SODIUM-DEXTROSE 2-3 GM-%(50ML) RECONSTITUTED SOLUTION: Performed by: UROLOGY

## 2022-03-08 PROCEDURE — 25010000002 MORPHINE SULFATE (PF) 2 MG/ML SOLUTION: Performed by: FAMILY MEDICINE

## 2022-03-08 PROCEDURE — 96365 THER/PROPH/DIAG IV INF INIT: CPT

## 2022-03-08 PROCEDURE — G0378 HOSPITAL OBSERVATION PER HR: HCPCS

## 2022-03-08 PROCEDURE — C9803 HOPD COVID-19 SPEC COLLECT: HCPCS

## 2022-03-08 PROCEDURE — 25010000002 ENOXAPARIN PER 10 MG: Performed by: FAMILY MEDICINE

## 2022-03-08 PROCEDURE — 85025 COMPLETE CBC W/AUTO DIFF WBC: CPT | Performed by: EMERGENCY MEDICINE

## 2022-03-08 PROCEDURE — 25010000002 KETOROLAC TROMETHAMINE PER 15 MG: Performed by: EMERGENCY MEDICINE

## 2022-03-08 PROCEDURE — 96372 THER/PROPH/DIAG INJ SC/IM: CPT

## 2022-03-08 PROCEDURE — 25010000002 ONDANSETRON PER 1 MG: Performed by: EMERGENCY MEDICINE

## 2022-03-08 RX ORDER — HYDROCODONE BITARTRATE AND ACETAMINOPHEN 7.5; 325 MG/1; MG/1
1 TABLET ORAL EVERY 4 HOURS PRN
Qty: 10 TABLET | Refills: 0 | Status: SHIPPED | OUTPATIENT
Start: 2022-03-08 | End: 2022-03-09 | Stop reason: HOSPADM

## 2022-03-08 RX ORDER — ONDANSETRON 2 MG/ML
4 INJECTION INTRAMUSCULAR; INTRAVENOUS EVERY 6 HOURS PRN
Status: DISCONTINUED | OUTPATIENT
Start: 2022-03-08 | End: 2022-03-10 | Stop reason: HOSPADM

## 2022-03-08 RX ORDER — SODIUM CHLORIDE 0.9 % (FLUSH) 0.9 %
3 SYRINGE (ML) INJECTION EVERY 12 HOURS SCHEDULED
Status: DISCONTINUED | OUTPATIENT
Start: 2022-03-08 | End: 2022-03-10 | Stop reason: HOSPADM

## 2022-03-08 RX ORDER — ACETAMINOPHEN 325 MG/1
975 TABLET ORAL ONCE
Status: DISCONTINUED | OUTPATIENT
Start: 2022-03-08 | End: 2022-03-08

## 2022-03-08 RX ORDER — HYDROCODONE BITARTRATE AND ACETAMINOPHEN 7.5; 325 MG/1; MG/1
1 TABLET ORAL EVERY 6 HOURS PRN
Status: DISCONTINUED | OUTPATIENT
Start: 2022-03-08 | End: 2022-03-10 | Stop reason: HOSPADM

## 2022-03-08 RX ORDER — FOLIC ACID 1 MG/1
3 TABLET ORAL DAILY
Status: DISCONTINUED | OUTPATIENT
Start: 2022-03-08 | End: 2022-03-10 | Stop reason: HOSPADM

## 2022-03-08 RX ORDER — HYDROCODONE BITARTRATE AND ACETAMINOPHEN 7.5; 325 MG/1; MG/1
1 TABLET ORAL ONCE AS NEEDED
Status: COMPLETED | OUTPATIENT
Start: 2022-03-08 | End: 2022-03-08

## 2022-03-08 RX ORDER — ACETAMINOPHEN 650 MG/1
650 SUPPOSITORY RECTAL EVERY 4 HOURS PRN
Status: DISCONTINUED | OUTPATIENT
Start: 2022-03-08 | End: 2022-03-08

## 2022-03-08 RX ORDER — CETIRIZINE HYDROCHLORIDE 10 MG/1
10 TABLET ORAL DAILY
Status: DISCONTINUED | OUTPATIENT
Start: 2022-03-08 | End: 2022-03-10 | Stop reason: HOSPADM

## 2022-03-08 RX ORDER — MORPHINE SULFATE 4 MG/ML
4 INJECTION, SOLUTION INTRAMUSCULAR; INTRAVENOUS ONCE
Status: COMPLETED | OUTPATIENT
Start: 2022-03-08 | End: 2022-03-08

## 2022-03-08 RX ORDER — AMOXICILLIN 250 MG
2 CAPSULE ORAL 2 TIMES DAILY
Status: DISCONTINUED | OUTPATIENT
Start: 2022-03-08 | End: 2022-03-10 | Stop reason: HOSPADM

## 2022-03-08 RX ORDER — ONDANSETRON 2 MG/ML
4 INJECTION INTRAMUSCULAR; INTRAVENOUS ONCE
Status: COMPLETED | OUTPATIENT
Start: 2022-03-08 | End: 2022-03-08

## 2022-03-08 RX ORDER — BISACODYL 5 MG/1
5 TABLET, DELAYED RELEASE ORAL DAILY PRN
Status: DISCONTINUED | OUTPATIENT
Start: 2022-03-08 | End: 2022-03-10 | Stop reason: HOSPADM

## 2022-03-08 RX ORDER — BISACODYL 10 MG
10 SUPPOSITORY, RECTAL RECTAL DAILY PRN
Status: DISCONTINUED | OUTPATIENT
Start: 2022-03-08 | End: 2022-03-10 | Stop reason: HOSPADM

## 2022-03-08 RX ORDER — SODIUM CHLORIDE 0.9 % (FLUSH) 0.9 %
10 SYRINGE (ML) INJECTION AS NEEDED
Status: DISCONTINUED | OUTPATIENT
Start: 2022-03-08 | End: 2022-03-10 | Stop reason: HOSPADM

## 2022-03-08 RX ORDER — CEFAZOLIN SODIUM 2 G/50ML
2 SOLUTION INTRAVENOUS ONCE
Status: COMPLETED | OUTPATIENT
Start: 2022-03-08 | End: 2022-03-08

## 2022-03-08 RX ORDER — SODIUM CHLORIDE 0.9 % (FLUSH) 0.9 %
3-10 SYRINGE (ML) INJECTION AS NEEDED
Status: DISCONTINUED | OUTPATIENT
Start: 2022-03-08 | End: 2022-03-10 | Stop reason: HOSPADM

## 2022-03-08 RX ORDER — ACETAMINOPHEN 160 MG/5ML
650 SOLUTION ORAL EVERY 4 HOURS PRN
Status: DISCONTINUED | OUTPATIENT
Start: 2022-03-08 | End: 2022-03-08

## 2022-03-08 RX ORDER — MORPHINE SULFATE 2 MG/ML
2 INJECTION, SOLUTION INTRAMUSCULAR; INTRAVENOUS EVERY 4 HOURS PRN
Status: DISCONTINUED | OUTPATIENT
Start: 2022-03-08 | End: 2022-03-10 | Stop reason: HOSPADM

## 2022-03-08 RX ORDER — SODIUM CHLORIDE 9 MG/ML
100 INJECTION, SOLUTION INTRAVENOUS CONTINUOUS
Status: DISCONTINUED | OUTPATIENT
Start: 2022-03-08 | End: 2022-03-10 | Stop reason: HOSPADM

## 2022-03-08 RX ORDER — ACETAMINOPHEN 325 MG/1
650 TABLET ORAL EVERY 4 HOURS PRN
Status: DISCONTINUED | OUTPATIENT
Start: 2022-03-08 | End: 2022-03-08

## 2022-03-08 RX ORDER — KETOROLAC TROMETHAMINE 30 MG/ML
30 INJECTION, SOLUTION INTRAMUSCULAR; INTRAVENOUS ONCE
Status: COMPLETED | OUTPATIENT
Start: 2022-03-08 | End: 2022-03-08

## 2022-03-08 RX ORDER — POLYETHYLENE GLYCOL 3350 17 G/17G
17 POWDER, FOR SOLUTION ORAL DAILY PRN
Status: DISCONTINUED | OUTPATIENT
Start: 2022-03-08 | End: 2022-03-10 | Stop reason: HOSPADM

## 2022-03-08 RX ADMIN — BISACODYL 5 MG: 5 TABLET, COATED ORAL at 14:32

## 2022-03-08 RX ADMIN — MORPHINE SULFATE 2 MG: 2 INJECTION, SOLUTION INTRAMUSCULAR; INTRAVENOUS at 11:45

## 2022-03-08 RX ADMIN — ONDANSETRON 4 MG: 2 INJECTION INTRAMUSCULAR; INTRAVENOUS at 07:45

## 2022-03-08 RX ADMIN — ONDANSETRON 4 MG: 2 INJECTION INTRAMUSCULAR; INTRAVENOUS at 12:00

## 2022-03-08 RX ADMIN — HYDROMORPHONE HYDROCHLORIDE 1 MG: 1 INJECTION, SOLUTION INTRAMUSCULAR; INTRAVENOUS; SUBCUTANEOUS at 09:57

## 2022-03-08 RX ADMIN — ONDANSETRON 4 MG: 2 INJECTION INTRAMUSCULAR; INTRAVENOUS at 03:49

## 2022-03-08 RX ADMIN — SENNOSIDES AND DOCUSATE SODIUM 2 TABLET: 50; 8.6 TABLET ORAL at 20:51

## 2022-03-08 RX ADMIN — SODIUM CHLORIDE 100 ML/HR: 9 INJECTION, SOLUTION INTRAVENOUS at 10:06

## 2022-03-08 RX ADMIN — CEFAZOLIN SODIUM 2 G: 2 SOLUTION INTRAVENOUS at 09:30

## 2022-03-08 RX ADMIN — MORPHINE SULFATE 2 MG: 2 INJECTION, SOLUTION INTRAMUSCULAR; INTRAVENOUS at 16:44

## 2022-03-08 RX ADMIN — KETOROLAC TROMETHAMINE 30 MG: 30 INJECTION, SOLUTION INTRAMUSCULAR; INTRAVENOUS at 03:49

## 2022-03-08 RX ADMIN — MORPHINE SULFATE 2 MG: 2 INJECTION, SOLUTION INTRAMUSCULAR; INTRAVENOUS at 20:51

## 2022-03-08 RX ADMIN — FOLIC ACID 3 MG: 1 TABLET ORAL at 14:32

## 2022-03-08 RX ADMIN — HYDROCODONE BITARTRATE AND ACETAMINOPHEN 1 TABLET: 7.5; 325 TABLET ORAL at 22:35

## 2022-03-08 RX ADMIN — CETIRIZINE HYDROCHLORIDE 10 MG: 10 TABLET, FILM COATED ORAL at 14:32

## 2022-03-08 RX ADMIN — ENOXAPARIN SODIUM 40 MG: 40 INJECTION SUBCUTANEOUS at 14:32

## 2022-03-08 RX ADMIN — MORPHINE SULFATE 4 MG: 4 INJECTION, SOLUTION INTRAMUSCULAR; INTRAVENOUS at 07:39

## 2022-03-08 RX ADMIN — HYDROCODONE BITARTRATE AND ACETAMINOPHEN 1 TABLET: 7.5; 325 TABLET ORAL at 13:34

## 2022-03-08 RX ADMIN — SODIUM CHLORIDE 100 ML/HR: 9 INJECTION, SOLUTION INTRAVENOUS at 20:51

## 2022-03-08 NOTE — EXTERNAL PATIENT INSTRUCTIONS
Patient Education   Table of Contents       Hydronephrosis     To view videos and all your education online visit,   [email or web address]   or scan this QR code with your smartphone.                  Hydronephrosis        Hydronephrosis is the swelling of one or both kidneys due to a blockage that stops urine from flowing out of the body. Kidneys filter waste from the blood and produce urine. This condition can lead to kidney failure and may become life-threatening if not treated promptly.     What are the causes?   In infants and children, common causes include problems that occur when a baby is developing in the womb. These can include problems in the kidneys or in the tubes that drain urine into the bladder (ureters).    In adults, common causes include:       Kidney stones.       Pregnancy.       A tumor or cyst in the abdomen or pelvis.       An enlarged prostate gland.      Other causes include:       Bladder infection.       Scar tissue from a previous surgery or injury.       A blood clot.       Cancer of the prostate, bladder, uterus, ovary, or colon.     What are the signs or symptoms?    Symptoms of this condition include:       Pain or discomfort in your side (flank) or abdomen.       Swelling in your abdomen.       Nausea and vomiting.       Fever.       Pain when passing urine.       Feelings of urgency when you need to urinate.       Urinating more often than normal.     In some cases, you may not have any symptoms.   How is this diagnosed?    This condition may be diagnosed based on:       Your symptoms and medical history.       A physical exam.       Blood and urine tests.       Imaging tests, such as an ultrasound, CT scan, or MRI.       A procedure to look at your urinary tract and bladder by inserting a scope into the urethra (cystoscopy).     How is this treated?    Treatment for this condition depends on where the blockage is, how long it has been there, and what caused it. The goal  of treatment is to remove the blockage. Treatment may include:       Antibiotic medicines to treat or prevent infection.       A procedure to place a small, thin tube (stent) into a blocked ureter. The stent will keep the ureter open so that urine can drain through it.       A nonsurgical procedure that crushes kidney stones with shock waves (extracorporeal shock wave lithotripsy).       If kidney failure occurs, treatment may include dialysis or a kidney transplant.     Follow these instructions at home:            Take over-the-counter and prescription medicines only as told by your health care provider.       If you were prescribed an antibiotic medicine, take it exactly as told by your health care provider. Do not  stop taking the antibiotic even if you start to feel better.       Rest and return to your normal activities as told by your health care provider. Ask your health care provider what activities are safe for you.       Drink enough fluid to keep your urine pale yellow.       Keep all follow-up visits. This is important.       Contact a health care provider if:         You continue to have symptoms after treatment.       You develop new symptoms.       Your urine becomes cloudy or bloody.       You have a fever.     Get help right away if:         You have severe flank or abdominal pain.       You cannot drink fluids without vomiting.     Summary         Hydronephrosis is the swelling of one or both kidneys due to a blockage that stops urine from flowing out of the body.       Hydronephrosis can lead to kidney failure and may become life-threatening if not treated promptly.       The goal of treatment is to remove the blockage. It may include a procedure to insert a stent into a blocked ureter, a procedure to break up kidney stones, or taking antibiotic medicines.       Follow your health care provider's instructions for taking care of yourself at home, including instructions about drinking fluids,  taking medicines, and limiting activities.     This information is not intended to replace advice given to you by your health care provider. Make sure you discuss any questions you have with your health care provider.     Document Released: 10/14/2008Document Revised: 04/06/2021Document Reviewed: 04/06/2021     Elsevier Patient Education ? 2022 Elsevier Inc.

## 2022-03-08 NOTE — PROGRESS NOTES
Please have hospital medicine admit patient and I will add her on for surgery tomorrow for ureteroscopy and laser lithotripsy.

## 2022-03-08 NOTE — ED PROVIDER NOTES
Subjective   51-year-old female presents to the ED with a chief complaint of flank pain.  Patient is complaining of severe sudden onset left-sided flank pain that started tonight.  She states that she had an outpatient procedure performed Friday, 3 days ago by Dr. Ferrell.  She indicates that she had lithotripsy on a 5 mm stone.  She was doing well status post treatment until sudden onset of pain tonight.  She rates the pain as severe, 12 out of 10.  She has had some associated nausea and vomiting.  No fever chills.  Has had some dysuria.  No obvious hematuria.  No prior treatments or limiting factors.  No prior evaluations.  No other complaints at this time.          Review of Systems   Gastrointestinal: Positive for nausea and vomiting. Negative for abdominal pain.   Genitourinary: Positive for flank pain.   All other systems reviewed and are negative.      Past Medical History:   Diagnosis Date   • Abnormal Pap smear of cervix    • Acid reflux    • Anemia    • Anxiety    • Back pain    • Bipolar disorder (HCC)    • CHF (congestive heart failure) ([facility]) 2017   • Deafness    • Depression    • Fibromyalgia    • Folic acid deficiency    • Gout    • Headache    • History of blood transfusion     at birth   • HTN (hypertension)    • Hyperlipidemia    • Insomnia    • Iron deficiency    • Kidney stone    • Liver disease    • Ovarian cyst    • Panic attack    • PMS (premenstrual syndrome)    • RA (rheumatoid arthritis) ([facility])    • RLS (restless legs syndrome)    • Seasonal allergies    • Sinus problem    • Sleep apnea    • Tattoos    • Trauma    • Urinary tract infection    • Vision problems    • Vitamin B12 deficiency        Allergies   Allergen Reactions   • Latex Rash       Past Surgical History:   Procedure Laterality Date   • CERVIX LESION DESTRUCTION     • COLONOSCOPY  8- years or so    [facility]   • TUBAL ABDOMINAL LIGATION  1994       Family History   Problem Relation Age of Onset   • Coronary artery  disease Father    • Hypertension Father    • Melanoma Father    • Stroke Maternal Grandmother    • Breast cancer Maternal Aunt    • Cervical cancer Maternal Aunt    • Liver cancer Paternal Aunt    • Inflammatory bowel disease Paternal Aunt    • Liver cancer Paternal Uncle    • Liver cancer Paternal Grandfather    • Cirrhosis Maternal Uncle         alcohol induced   • Colon cancer Neg Hx        Social History     Socioeconomic History   • Marital status:    Tobacco Use   • Smoking status: Former Smoker     Packs/day: 0.25     Types: Cigarettes     Start date: 1997   • Smokeless tobacco: Never Used   Vaping Use   • Vaping Use: Never used   Substance and Sexual Activity   • Alcohol use: Yes     Comment: social   • Drug use: Never   • Sexual activity: Defer           Objective   Physical Exam  Vitals and nursing note reviewed.   Constitutional:       General: She is not in acute distress.     Appearance: She is well-developed. She is obese. She is not diaphoretic.   HENT:      Head: Normocephalic and atraumatic.      Nose: Nose normal.   Eyes:      Conjunctiva/sclera: Conjunctivae normal.   Cardiovascular:      Rate and Rhythm: Normal rate and regular rhythm.   Pulmonary:      Effort: Pulmonary effort is normal. No respiratory distress.      Breath sounds: Normal breath sounds.   Abdominal:      General: There is no distension.      Palpations: Abdomen is soft.      Tenderness: There is no abdominal tenderness. There is no guarding.   Genitourinary:     Comments: Left flank/CVA TTP  Musculoskeletal:         General: No deformity.   Neurological:      Mental Status: She is alert and oriented to person, place, and time.      Cranial Nerves: No cranial nerve deficit.         Procedures           ED Course  ED Course as of 03/08/22 1738   Tue Mar 08, 2022   0646 CT performed on February 23, 2022 at [facility] showed left-sided nephrolithiasis without any signs of hydronephrosis. [CG]   5792 CLINICAL  HISTORY:  Severe left flank pain status post lithotripsy 4 days ago     COMPARISON:  null     FINDINGS:  CT abdomen and pelvis without contrast.     Comparison: None.     Technique: Unenhanced axial sections with multiplanar reformats.     Findings:     Two closely adjacent stones in the distal left ureter measuring up to 2 mm located 2 cm proximal to the ureterovesicular junction. 2.2 mm stone at the left ureterovesicular junction. 1.3 mm stone in the posterior lateral left bladder lumen.   Moderate-severe left hydronephrosis. Left perinephric fat stranding. Left kidney is edematous. At least 6 nonobstructing stones measuring up to 5.6 mm within the left kidney. 1.9 mm nonobstructing stone in the posterior dependent dilated left renal   pelvis. Right kidney is absent.     Several rim calcified stones in the distal gallbladder measuring up to 19 mm. No CT evidence of cholecystitis. Normal liver, common bile duct, pancreas, spleen, and adrenal glands.     Remaining pelvic organs are normal.     Aorta is normal caliber.     No bowel obstruction. No inflammatory bowel changes. Normal appendix.     No free intraperitoneal air or free fluid.     Lung bases are clear. No suspicious bony lesions.     IMPRESSION:  IMPRESSION:     3 stones in the distal left ureter. One stone in the bladder and 1 small stone in the left renal pelvis. Moderate-severe left hydronephrosis.     Nonobstructing left nephrolithiasis.     Absent right kidney.     Cholelithiasis.     Authenticated by Johanne Zeng DO on 03/08/2022 05:49:00 AM          Specimen Collected: 03/08/22 05:49 Last Resulted: 03/08/22 05:49           [PF]   0818 Methodology:: Manual Light Microscopy [PF]   0818 RBC, UA(!): 31-50 [PF]   0818 WBC, UA(!): 0-2 [PF]   0818 Lipase(!): 11 [PF]   0818 Glucose(!): 133 [PF]   0818 BUN: 11 [PF]   0818 Creatinine: 0.96 [PF]   0818 Sodium(!): 133 [PF]   0818 Potassium: 3.8 [PF]   0818 Chloride: 99 [PF]   0818 CO2(!): 17.8 [PF]   0818  Calcium(!): 8.5 [PF]   0818 Total Protein: 6.9 [PF]   0818 Albumin: 4.20 [PF]   0818 ALT (SGPT): 13 [PF]   0818 AST (SGOT): 20 [PF]   0819 Alkaline Phosphatase: 89 [PF]   0819 Total Bilirubin: 0.3 [PF]   0819 Blood, UA(!): Moderate (2+) [PF]   0819 Protein, UA: Negative [PF]   0819 Leukocytes, UA: Negative [PF]   0819 Nitrite, UA: Negative [PF]   0819 WBC: 9.35 [PF]   0819 Hemoglobin: 14.1 [PF]   0819 Hematocrit: 41.1 [PF]   0819 Platelets: 283 [PF]      ED Course User Index  [CG] Armond Garcia DO  [PF] Addison Salomon, DO HOLLEY reviewed by Hodan Martinez MD, Armond Garcia DO, Finley, Paul W, Brian BELL Andrew Michael, MD             [facility]  17:38 EST  Received care from Dr. Garcia in regards to assessment of pain management.  Patient initially planned to be discharged home with outpatient follow-up.  On my arrival to her room but just prior to administration of morphine she appeared to be significantly uncomfortable in intense pain.  Patient is concerned about poor pain control with no relief with home medications.  Offered to reach out to her urologist for transfer or consult with urology here.  Patient requested admission here.  , urology, was consulted, he advised is ok to consultt, recommended n.p.o. after midnight, preop Covid testing, pain control.    Work-up reveals moderate to severe left hydronephrosis with multiple ureteral stones largest measuring 6 mm and intrarenal stones.  No evidence of UTI.  Call placed to hospitalist  Final diagnoses:   Ureteral stone with hydronephrosis   Non-intractable vomiting with nausea, unspecified vomiting type   Renal colic on left side       ED Disposition  ED Disposition     ED Disposition   Decision to Admit    Condition   --    Comment   Level of Care: Med/Surg [1]   Diagnosis: Ureteral stone with hydronephrosis [981184]   Admitting Physician: BELL CARTER [[id number]]               Abby Manning, JARAD  [address] [place] [address]  Gris Aguero  [facility] [id number] [phone]          Boston Ferrell MD  [address] [facility]  [address]  [facility] [id number] [phone]               Medication List      New Prescriptions    HYDROcodone-acetaminophen 7.5-325 MG per tablet  Commonly known as: NORCO  Take 1 tablet by mouth Every 4 (Four) Hours As Needed for Severe Pain .           Where to Get Your Medications      These medications were sent to [facility] [address] [place] [address] - [address] [place] [address] [phone]  - [phone] [facility] [address]    Phone: [phone]   · HYDROcodone-acetaminophen 7.5-325 MG per tablet          Addison Salomon DO  03/08/22 1738

## 2022-03-08 NOTE — PLAN OF CARE
Goal Outcome Evaluation:  Plan of Care Reviewed With: patient        Progress: improving  Outcome Evaluation: Pain difficult to control today until adding PO medication to the morphine. Patient reports relief due to being able to void with fluids infusing.  and sister at bedside today. Plan for favio in AM, consent signed and in the chart. NPO at MN.

## 2022-03-08 NOTE — H&P
[facility]   HISTORY AND PHYSICAL      Name:  Abby Antoine   Age:  51 y.o.  Sex:  female  :  1970  MRN:  [id number]   Visit Number:  [id number]  Admission Date:  3/8/2022  Date Of Service:  22  Primary Care Physician:  Abby Manning APRN    Chief Complaint:     Left flank pain    History Of Presenting Illness:      Patient is a 51 years old female with past medical history of solitary kidney, nephrolithiasis, anxiety, bipolar, CHF, fibromyalgia, rheumatoid arthritis, restless leg syndrome and sleep apnea who presented to the ER with a chief complaint of left-sided flank pain.  Her pain started suddenly last night and was severe rated as 10 out of 10 and persisted until she arrived to the ER.  Patient had lithotripsy done at [facility] 3 days ago and was discharged on antibiotics but patient does not remember which one. Patient reported that she was doing well after the procedure until the pain started again last night.  Patient had nausea and vomited 3 times.  No fever or chills.  Patient reported that she was unable to urinate after her pain started.  However she denied any dysuria or burning on urination prior to her pain started.  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  Past surgical history include tubal ligation.  Patient denies any history of diabetes, hypertension or heart disease.    Upon ER evaluation, patient was initially hypertensive with a blood pressure of 195/115 which has improved after pain treatment.  Otherwise patient was afebrile with other vitals stable. Labs were significant for sodium of 133, glucose 133 otherwise unremarkable.  Urinalysis showed RBC 31-50, WBC 0-2, nitrates and leukocytes negative.  CT abdomen pelvis showed Two closely adjacent stones in the distal left ureter measuring up to 2 mm located 2 cm proximal to the ureterovesicular junction. 2.2 mm stone at the left ureterovesicular junction. 1.3  mm stone in the posterior lateral left bladder lumen. Moderate-severe left hydronephrosis. Left perinephric fat stranding. Left kidney is edematous. At least 6 nonobstructing stones measuring up to 5.6 mm within the left kidney. 1.9 mm nonobstructing stone in the posterior dependent dilated left renal pelvis. Right kidney is absent. Cholelithiasis.    Patient received Ancef, Dilaudid, Toradol and morphine in addition to Zofran and was started on IV fluids while in the ER. Dr. Torres was consulted by ER provider and recommended admission, n.p.o. after midnight and pain control.  Hospitalist consulted for admission, further evaluation treatment.        Review Of Systems:    All systems were reviewed and negative except as mentioned in history of presenting illness, assessment and plan.    Past Medical History: Patient  has a past medical history of Abnormal Pap smear of cervix, Acid reflux, Anemia, Anxiety, Back pain, Bipolar disorder ([facility]), CHF (congestive heart failure) ([facility]) (2017), Deafness, Depression, Fibromyalgia, Folic acid deficiency, Gout, Headache, History of blood transfusion, HTN (hypertension), Hyperlipidemia, Insomnia, Iron deficiency, Kidney stone, Liver disease, Ovarian cyst, Panic attack, PMS (premenstrual syndrome), RA (rheumatoid arthritis) ([facility]), RLS (restless legs syndrome), Seasonal allergies, Sinus problem, Sleep apnea, Tattoos, Trauma, Urinary tract infection, Vision problems, and Vitamin B12 deficiency.    Past Surgical History: Patient  has a past surgical history that includes Cervix lesion destruction; Tubal ligation (1994); and Colonoscopy (8- years or so).    Social History: Patient  reports that she has quit smoking. Her smoking use included cigarettes. She started smoking about 25 years ago. She smoked 0.25 packs per day. She has never used smokeless tobacco. She reports current alcohol use. She reports that she does not use drugs.    Family History: Patient's family history includes  Breast cancer in her maternal aunt; Cervical cancer in her maternal aunt; Cirrhosis in her maternal uncle; Coronary artery disease in her father; Hypertension in her father; Inflammatory bowel disease in her paternal aunt; Liver cancer in her paternal aunt, paternal grandfather, and paternal uncle; Melanoma in her father; Stroke in her maternal grandmother.    Allergies:      Latex    Home Medications:    Prior to Admission Medications     Prescriptions Last Dose Informant Patient Reported? Taking?    Adalimumab (Humira Pen) 40 MG/0.8ML Pen-injector Kit   Yes No    40 mg.    ergocalciferol (ERGOCALCIFEROL) 1.25 MG (17517 UT) capsule   Yes No    Vitamin D2 1,250 mcg (50,000 unit) capsule   TAKE 1 CAPSULE EVERY WEEK    Fluconazole (DIFLUCAN PO)   Yes No    Take  by mouth 1 (One) Time.    folic acid (FOLVITE) 1 MG tablet   Yes No    Take 3 mg by mouth Daily.    loratadine (Claritin) 10 MG tablet   Yes No    Take 10 mg by mouth Daily.    methotrexate 2.5 MG tablet   Yes No    Take 5 mg by mouth 1 (One) Time Per Week.    multivitamin (Once Daily) tablet tablet   Yes No    Take 1 tablet by mouth Daily.        ED Medications:    Medications   sodium chloride 0.9 % flush 10 mL (has no administration in time range)   sodium chloride 0.9 % infusion (has no administration in time range)   ceFAZolin Sodium-Dextrose (ANCEF) IVPB (duplex) 2 g (has no administration in time range)   acetaminophen (TYLENOL) tablet 975 mg (has no administration in time range)   ondansetron (ZOFRAN) injection 4 mg (4 mg Intravenous Given 3/8/22 0349)   ketorolac (TORADOL) injection 30 mg (30 mg Intravenous Given 3/8/22 0349)   Morphine sulfate (PF) injection 4 mg (4 mg Intravenous Given 3/8/22 0739)   ondansetron (ZOFRAN) injection 4 mg (4 mg Intravenous Given 3/8/22 0745)     Vital Signs:  Temp:  [98.1 °F (36.7 °C)] 98.1 °F (36.7 °C)  Heart Rate:  [62] 62  Resp:  [20] 20  BP: (195)/(115) 195/115        03/08/22  0332   Weight: 96.2 kg (212 lb)  "    Body mass index is 36.39 kg/m².    Physical Exam:     Most recent vital Signs: BP (!) 195/115 (BP Location: Left arm, Patient Position: Sitting)   Pulse 62   Temp 98.1 °F (36.7 °C) (Oral)   Resp 20   Ht 162.6 cm (64\")   Wt 96.2 kg (212 lb)   SpO2 96%   BMI 36.39 kg/m²     Physical Exam  Vitals and nursing note reviewed.   Constitutional:       General: She is not in acute distress.     Appearance: Normal appearance. She is obese.   HENT:      Head: Normocephalic and atraumatic.      Right Ear: External ear normal.      Left Ear: External ear normal.      Nose: Nose normal.      Mouth/Throat:      Mouth: Mucous membranes are moist.   Eyes:      Extraocular Movements: Extraocular movements intact.      Conjunctiva/sclera: Conjunctivae normal.      Pupils: Pupils are equal, round, and reactive to light.   Cardiovascular:      Rate and Rhythm: Normal rate and regular rhythm.      Pulses: Normal pulses.      Heart sounds: Normal heart sounds.   Pulmonary:      Effort: Pulmonary effort is normal. No respiratory distress.      Breath sounds: Normal breath sounds. No wheezing or rhonchi.   Abdominal:      General: Bowel sounds are normal. There is no distension.      Palpations: Abdomen is soft.      Tenderness: There is no abdominal tenderness. There is left CVA tenderness. There is no guarding or rebound.   Musculoskeletal:         General: Normal range of motion.      Cervical back: Normal range of motion and neck supple.      Right lower leg: No edema.      Left lower leg: No edema.   Skin:     General: Skin is warm and dry.      Findings: No rash.   Neurological:      General: No focal deficit present.      Mental Status: She is alert and oriented to person, place, and time. Mental status is at baseline.      Motor: No weakness.   Psychiatric:         Mood and Affect: Mood normal.         Behavior: Behavior normal.         Thought Content: Thought content normal.         Laboratory data:    I have reviewed " the labs done in the emergency room.    Results from last 7 days   Lab Units 03/08/22  0349   SODIUM mmol/L 133*   POTASSIUM mmol/L 3.8   CHLORIDE mmol/L 99   CO2 mmol/L 17.8*   BUN mg/dL 11   CREATININE mg/dL 0.96   CALCIUM mg/dL 8.5*   BILIRUBIN mg/dL 0.3   ALK PHOS U/L 89   ALT (SGPT) U/L 13   AST (SGOT) U/L 20   GLUCOSE mg/dL 133*     Results from last 7 days   Lab Units 03/08/22  0349   WBC 10*3/mm3 9.35   HEMOGLOBIN g/dL 14.1   HEMATOCRIT % 41.1   PLATELETS 10*3/mm3 283                     Results from last 7 days   Lab Units 03/08/22  0349   LIPASE U/L 11*         Results from last 7 days   Lab Units 03/08/22  0349   COLOR UA  Yellow   GLUCOSE UA  Negative   KETONES UA  Negative   LEUKOCYTES UA  Negative   PH, URINE  5.5   BILIRUBIN UA  Negative   UROBILINOGEN UA  0.2 E.U./dL   RBC UA /HPF 31-50*   WBC UA /HPF 0-2*       Pain Management Panel    There is no flowsheet data to display.           Radiology:    CT Abdomen Pelvis Stone Protocol    Result Date: 3/8/2022  FINAL REPORT TECHNIQUE: null CLINICAL HISTORY: Severe left flank pain status post lithotripsy 4 days ago COMPARISON: null FINDINGS: CT abdomen and pelvis without contrast. Comparison: None. Technique: Unenhanced axial sections with multiplanar reformats. Findings: Two closely adjacent stones in the distal left ureter measuring up to 2 mm located 2 cm proximal to the ureterovesicular junction. 2.2 mm stone at the left ureterovesicular junction. 1.3 mm stone in the posterior lateral left bladder lumen. Moderate-severe left hydronephrosis. Left perinephric fat stranding. Left kidney is edematous. At least 6 nonobstructing stones measuring up to 5.6 mm within the left kidney. 1.9 mm nonobstructing stone in the posterior dependent dilated left renal pelvis. Right kidney is absent. Several rim calcified stones in the distal gallbladder measuring up to 19 mm. No CT evidence of cholecystitis. Normal liver, common bile duct, pancreas, spleen, and adrenal  glands. Remaining pelvic organs are normal. Aorta is normal caliber. No bowel obstruction. No inflammatory bowel changes. Normal appendix. No free intraperitoneal air or free fluid. Lung bases are clear. No suspicious bony lesions.     IMPRESSION: 3 stones in the distal left ureter. One stone in the bladder and 1 small stone in the left renal pelvis. Moderate-severe left hydronephrosis. Nonobstructing left nephrolithiasis. Absent right kidney. Cholelithiasis. Authenticated by Johanne Zeng DO on 03/08/2022 05:49:00 AM      Assessment:    1. Nephrolithiasis with hydronephrosis, POA  2. Intractable nausea and vomiting, POA  3. Solitary kidney, congenital  4. Hx of nephrolithiasis, anxiety, bipolar, CHF, fibromyalgia, rheumatoid arthritis, restless leg syndrome and sleep apnea     Plan:    Patient is admitted for further evaluation and treatment.  Dr. Torres is consulted and appreciate his recommendations.  We will continue with pain control and IV fluids for hydration.  Zofran as needed.  No signs of UTI, urine negative for infection.  We will hold off on further antibiotics at this time. Blood pressure improving down after pain treatment, will continue to monitor.  Patient will be n.p.o. after midnight.    Risk Assessment: Moderate to high  DVT Prophylaxis: Lovenox  Code Status: Full  Diet: Regular, n.p.o. after midnight    Advance Care Planning   ACP discussion was held with the patient during this visit. Patient does not have an advance directive, information provided.      Hodan Martinez MD  03/08/22  08:30 EST    Dictated utilizing Dragon dictation.

## 2022-03-09 ENCOUNTER — ANESTHESIA (OUTPATIENT)
Dept: PERIOP | Facility: HOSPITAL | Age: 52
End: 2022-03-09

## 2022-03-09 ENCOUNTER — ANESTHESIA EVENT (OUTPATIENT)
Dept: PERIOP | Facility: HOSPITAL | Age: 52
End: 2022-03-09

## 2022-03-09 LAB
ANION GAP SERPL CALCULATED.3IONS-SCNC: 9.3 MMOL/L (ref 5–15)
B-HCG UR QL: NEGATIVE
BASOPHILS # BLD AUTO: 0.05 10*3/MM3 (ref 0–0.2)
BASOPHILS NFR BLD AUTO: 0.7 % (ref 0–1.5)
BUN SERPL-MCNC: 7 MG/DL (ref 6–20)
BUN/CREAT SERPL: 7.6 (ref 7–25)
CALCIUM SPEC-SCNC: 8.2 MG/DL (ref 8.6–10.5)
CHLORIDE SERPL-SCNC: 107 MMOL/L (ref 98–107)
CO2 SERPL-SCNC: 24.7 MMOL/L (ref 22–29)
CREAT SERPL-MCNC: 0.92 MG/DL (ref 0.57–1)
DEPRECATED RDW RBC AUTO: 45.1 FL (ref 37–54)
EGFRCR SERPLBLD CKD-EPI 2021: 76.5 ML/MIN/1.73
EOSINOPHIL # BLD AUTO: 0.55 10*3/MM3 (ref 0–0.4)
EOSINOPHIL NFR BLD AUTO: 7.4 % (ref 0.3–6.2)
ERYTHROCYTE [DISTWIDTH] IN BLOOD BY AUTOMATED COUNT: 14.8 % (ref 12.3–15.4)
GLUCOSE BLDC GLUCOMTR-MCNC: 83 MG/DL (ref 70–130)
GLUCOSE SERPL-MCNC: 95 MG/DL (ref 65–99)
HCT VFR BLD AUTO: 38.2 % (ref 34–46.6)
HGB BLD-MCNC: 12.8 G/DL (ref 12–15.9)
IMM GRANULOCYTES # BLD AUTO: 0.02 10*3/MM3 (ref 0–0.05)
IMM GRANULOCYTES NFR BLD AUTO: 0.3 % (ref 0–0.5)
LYMPHOCYTES # BLD AUTO: 3 10*3/MM3 (ref 0.7–3.1)
LYMPHOCYTES NFR BLD AUTO: 40.4 % (ref 19.6–45.3)
MCH RBC QN AUTO: 28.3 PG (ref 26.6–33)
MCHC RBC AUTO-ENTMCNC: 33.5 G/DL (ref 31.5–35.7)
MCV RBC AUTO: 84.3 FL (ref 79–97)
MONOCYTES # BLD AUTO: 0.65 10*3/MM3 (ref 0.1–0.9)
MONOCYTES NFR BLD AUTO: 8.7 % (ref 5–12)
NEUTROPHILS NFR BLD AUTO: 3.16 10*3/MM3 (ref 1.7–7)
NEUTROPHILS NFR BLD AUTO: 42.5 % (ref 42.7–76)
NRBC BLD AUTO-RTO: 0 /100 WBC (ref 0–0.2)
PLATELET # BLD AUTO: 235 10*3/MM3 (ref 140–450)
PMV BLD AUTO: 9.6 FL (ref 6–12)
POTASSIUM SERPL-SCNC: 3.6 MMOL/L (ref 3.5–5.2)
RBC # BLD AUTO: 4.53 10*6/MM3 (ref 3.77–5.28)
SODIUM SERPL-SCNC: 141 MMOL/L (ref 136–145)
WBC NRBC COR # BLD: 7.43 10*3/MM3 (ref 3.4–10.8)

## 2022-03-09 PROCEDURE — 25010000002 FENTANYL CITRATE (PF) 100 MCG/2ML SOLUTION: Performed by: NURSE ANESTHETIST, CERTIFIED REGISTERED

## 2022-03-09 PROCEDURE — 0 CEFAZOLIN SODIUM-DEXTROSE 2-3 GM-%(50ML) RECONSTITUTED SOLUTION: Performed by: UROLOGY

## 2022-03-09 PROCEDURE — C2617 STENT, NON-COR, TEM W/O DEL: HCPCS | Performed by: UROLOGY

## 2022-03-09 PROCEDURE — 25010000002 KETOROLAC TROMETHAMINE PER 15 MG: Performed by: NURSE ANESTHETIST, CERTIFIED REGISTERED

## 2022-03-09 PROCEDURE — G0378 HOSPITAL OBSERVATION PER HR: HCPCS

## 2022-03-09 PROCEDURE — 99024 POSTOP FOLLOW-UP VISIT: CPT | Performed by: PHYSICIAN ASSISTANT

## 2022-03-09 PROCEDURE — A9270 NON-COVERED ITEM OR SERVICE: HCPCS | Performed by: UROLOGY

## 2022-03-09 PROCEDURE — C1894 INTRO/SHEATH, NON-LASER: HCPCS | Performed by: UROLOGY

## 2022-03-09 PROCEDURE — 96361 HYDRATE IV INFUSION ADD-ON: CPT

## 2022-03-09 PROCEDURE — 25010000002 ONDANSETRON PER 1 MG: Performed by: NURSE ANESTHETIST, CERTIFIED REGISTERED

## 2022-03-09 PROCEDURE — 63710000001 OPIUM-BELLADONNA 16.2-30 MG SUPPOSITORY: Performed by: UROLOGY

## 2022-03-09 PROCEDURE — 25010000002 PROPOFOL 10 MG/ML EMULSION: Performed by: NURSE ANESTHETIST, CERTIFIED REGISTERED

## 2022-03-09 PROCEDURE — 63710000001 SENNOSIDES-DOCUSATE 8.6-50 MG TABLET: Performed by: UROLOGY

## 2022-03-09 PROCEDURE — 80048 BASIC METABOLIC PNL TOTAL CA: CPT | Performed by: FAMILY MEDICINE

## 2022-03-09 PROCEDURE — 81025 URINE PREGNANCY TEST: CPT | Performed by: OBSTETRICS & GYNECOLOGY

## 2022-03-09 PROCEDURE — 82962 GLUCOSE BLOOD TEST: CPT

## 2022-03-09 PROCEDURE — C1758 CATHETER, URETERAL: HCPCS | Performed by: UROLOGY

## 2022-03-09 PROCEDURE — C1769 GUIDE WIRE: HCPCS | Performed by: UROLOGY

## 2022-03-09 PROCEDURE — 25010000002 ENOXAPARIN PER 10 MG: Performed by: FAMILY MEDICINE

## 2022-03-09 PROCEDURE — 25010000002 MORPHINE SULFATE (PF) 2 MG/ML SOLUTION: Performed by: FAMILY MEDICINE

## 2022-03-09 PROCEDURE — 96376 TX/PRO/DX INJ SAME DRUG ADON: CPT

## 2022-03-09 PROCEDURE — 85025 COMPLETE CBC W/AUTO DIFF WBC: CPT | Performed by: FAMILY MEDICINE

## 2022-03-09 PROCEDURE — 99226 PR SBSQ OBSERVATION CARE/DAY 35 MINUTES: CPT | Performed by: FAMILY MEDICINE

## 2022-03-09 PROCEDURE — 25010000002 IOPAMIDOL 61 % SOLUTION: Performed by: UROLOGY

## 2022-03-09 PROCEDURE — 63710000001 HYDROCODONE-ACETAMINOPHEN 7.5-325 MG TABLET: Performed by: UROLOGY

## 2022-03-09 PROCEDURE — 52356 CYSTO/URETERO W/LITHOTRIPSY: CPT | Performed by: UROLOGY

## 2022-03-09 PROCEDURE — 25010000002 DEXAMETHASONE PER 1 MG: Performed by: NURSE ANESTHETIST, CERTIFIED REGISTERED

## 2022-03-09 DEVICE — URETERAL STENT
Type: IMPLANTABLE DEVICE | Site: URETER | Status: FUNCTIONAL
Brand: CONTOUR™

## 2022-03-09 RX ORDER — DOCUSATE SODIUM 100 MG/1
100 CAPSULE, LIQUID FILLED ORAL 2 TIMES DAILY
Qty: 15 CAPSULE | Refills: 1 | Status: SHIPPED | OUTPATIENT
Start: 2022-03-09

## 2022-03-09 RX ORDER — PROPOFOL 10 MG/ML
VIAL (ML) INTRAVENOUS AS NEEDED
Status: DISCONTINUED | OUTPATIENT
Start: 2022-03-09 | End: 2022-03-09 | Stop reason: SURG

## 2022-03-09 RX ORDER — TAMSULOSIN HYDROCHLORIDE 0.4 MG/1
1 CAPSULE ORAL NIGHTLY
Qty: 10 CAPSULE | Refills: 0 | Status: SHIPPED | OUTPATIENT
Start: 2022-03-09

## 2022-03-09 RX ORDER — ACETAMINOPHEN 500 MG
1000 TABLET ORAL EVERY 6 HOURS
Qty: 30 TABLET | Refills: 0 | Status: SHIPPED | OUTPATIENT
Start: 2022-03-09 | End: 2022-03-13

## 2022-03-09 RX ORDER — ONDANSETRON 2 MG/ML
4 INJECTION INTRAMUSCULAR; INTRAVENOUS ONCE AS NEEDED
Status: DISCONTINUED | OUTPATIENT
Start: 2022-03-09 | End: 2022-03-09 | Stop reason: HOSPADM

## 2022-03-09 RX ORDER — OXYBUTYNIN CHLORIDE 10 MG/1
10 TABLET, EXTENDED RELEASE ORAL DAILY PRN
Qty: 10 TABLET | Refills: 0 | Status: SHIPPED | OUTPATIENT
Start: 2022-03-09

## 2022-03-09 RX ORDER — OXYCODONE HYDROCHLORIDE 5 MG/1
5 TABLET ORAL EVERY 6 HOURS PRN
Qty: 5 TABLET | Refills: 0 | Status: SHIPPED | OUTPATIENT
Start: 2022-03-09

## 2022-03-09 RX ORDER — FENTANYL CITRATE 50 UG/ML
INJECTION, SOLUTION INTRAMUSCULAR; INTRAVENOUS AS NEEDED
Status: DISCONTINUED | OUTPATIENT
Start: 2022-03-09 | End: 2022-03-09 | Stop reason: SURG

## 2022-03-09 RX ORDER — ATROPA BELLADONNA AND OPIUM 16.2; 3 MG/1; MG/1
SUPPOSITORY RECTAL AS NEEDED
Status: DISCONTINUED | OUTPATIENT
Start: 2022-03-09 | End: 2022-03-10 | Stop reason: HOSPADM

## 2022-03-09 RX ORDER — ONDANSETRON 2 MG/ML
INJECTION INTRAMUSCULAR; INTRAVENOUS AS NEEDED
Status: DISCONTINUED | OUTPATIENT
Start: 2022-03-09 | End: 2022-03-09 | Stop reason: SURG

## 2022-03-09 RX ORDER — DEXAMETHASONE SODIUM PHOSPHATE 4 MG/ML
INJECTION, SOLUTION INTRA-ARTICULAR; INTRALESIONAL; INTRAMUSCULAR; INTRAVENOUS; SOFT TISSUE AS NEEDED
Status: DISCONTINUED | OUTPATIENT
Start: 2022-03-09 | End: 2022-03-09 | Stop reason: SURG

## 2022-03-09 RX ORDER — LIDOCAINE HYDROCHLORIDE 20 MG/ML
INJECTION, SOLUTION EPIDURAL; INFILTRATION; INTRACAUDAL; PERINEURAL AS NEEDED
Status: DISCONTINUED | OUTPATIENT
Start: 2022-03-09 | End: 2022-03-09 | Stop reason: SURG

## 2022-03-09 RX ORDER — PHENAZOPYRIDINE HYDROCHLORIDE 100 MG/1
100 TABLET, FILM COATED ORAL 3 TIMES DAILY PRN
Qty: 21 TABLET | Refills: 0 | Status: SHIPPED | OUTPATIENT
Start: 2022-03-09

## 2022-03-09 RX ORDER — CIPROFLOXACIN 500 MG/1
500 TABLET, FILM COATED ORAL DAILY
Qty: 3 TABLET | Refills: 0 | Status: SHIPPED | OUTPATIENT
Start: 2022-03-09

## 2022-03-09 RX ORDER — KETOROLAC TROMETHAMINE 30 MG/ML
INJECTION, SOLUTION INTRAMUSCULAR; INTRAVENOUS AS NEEDED
Status: DISCONTINUED | OUTPATIENT
Start: 2022-03-09 | End: 2022-03-09 | Stop reason: SURG

## 2022-03-09 RX ORDER — EPHEDRINE SULFATE 5 MG/ML
INJECTION INTRAVENOUS AS NEEDED
Status: DISCONTINUED | OUTPATIENT
Start: 2022-03-09 | End: 2022-03-09 | Stop reason: SURG

## 2022-03-09 RX ORDER — CEFAZOLIN SODIUM 2 G/50ML
2 SOLUTION INTRAVENOUS ONCE
Status: COMPLETED | OUTPATIENT
Start: 2022-03-09 | End: 2022-03-09

## 2022-03-09 RX ORDER — SODIUM CHLORIDE 9 MG/ML
INJECTION, SOLUTION INTRAVENOUS CONTINUOUS PRN
Status: DISCONTINUED | OUTPATIENT
Start: 2022-03-09 | End: 2022-03-09 | Stop reason: SURG

## 2022-03-09 RX ADMIN — EPHEDRINE SULFATE 10 MG: 5 INJECTION INTRAVENOUS at 17:36

## 2022-03-09 RX ADMIN — PROPOFOL 150 MG: 10 INJECTION, EMULSION INTRAVENOUS at 17:12

## 2022-03-09 RX ADMIN — HYDROCODONE BITARTRATE AND ACETAMINOPHEN 1 TABLET: 7.5; 325 TABLET ORAL at 22:22

## 2022-03-09 RX ADMIN — FENTANYL CITRATE 100 MCG: 50 INJECTION INTRAMUSCULAR; INTRAVENOUS at 17:17

## 2022-03-09 RX ADMIN — MORPHINE SULFATE 2 MG: 2 INJECTION, SOLUTION INTRAMUSCULAR; INTRAVENOUS at 00:59

## 2022-03-09 RX ADMIN — LIDOCAINE HYDROCHLORIDE 100 MG: 20 INJECTION, SOLUTION EPIDURAL; INFILTRATION; INTRACAUDAL; PERINEURAL at 17:12

## 2022-03-09 RX ADMIN — SODIUM CHLORIDE 100 ML/HR: 9 INJECTION, SOLUTION INTRAVENOUS at 04:40

## 2022-03-09 RX ADMIN — DEXAMETHASONE SODIUM PHOSPHATE 4 MG: 4 INJECTION, SOLUTION INTRA-ARTICULAR; INTRALESIONAL; INTRAMUSCULAR; INTRAVENOUS; SOFT TISSUE at 17:17

## 2022-03-09 RX ADMIN — HYDROCODONE BITARTRATE AND ACETAMINOPHEN 1 TABLET: 7.5; 325 TABLET ORAL at 04:40

## 2022-03-09 RX ADMIN — CEFAZOLIN SODIUM 2 G: 2 SOLUTION INTRAVENOUS at 17:15

## 2022-03-09 RX ADMIN — SODIUM CHLORIDE 100 ML/HR: 9 INJECTION, SOLUTION INTRAVENOUS at 15:34

## 2022-03-09 RX ADMIN — SODIUM CHLORIDE: 9 INJECTION, SOLUTION INTRAVENOUS at 17:09

## 2022-03-09 RX ADMIN — SENNOSIDES AND DOCUSATE SODIUM 2 TABLET: 50; 8.6 TABLET ORAL at 21:16

## 2022-03-09 RX ADMIN — ENOXAPARIN SODIUM 40 MG: 40 INJECTION SUBCUTANEOUS at 14:26

## 2022-03-09 RX ADMIN — ONDANSETRON 4 MG: 2 INJECTION INTRAMUSCULAR; INTRAVENOUS at 17:47

## 2022-03-09 RX ADMIN — KETOROLAC TROMETHAMINE 30 MG: 30 INJECTION, SOLUTION INTRAMUSCULAR at 17:47

## 2022-03-09 NOTE — ANESTHESIA PREPROCEDURE EVALUATION
Anesthesia Evaluation     Patient summary reviewed and Nursing notes reviewed   NPO Solid Status: > 8 hours  NPO Liquid Status: > 8 hours           Airway   Mallampati: I  TM distance: >3 FB  Neck ROM: full  No difficulty expected  Dental - normal exam     Pulmonary     breath sounds clear to auscultation  (+) sleep apnea,   Cardiovascular   Exercise tolerance: good (4-7 METS)    Rhythm: regular  Rate: normal    (+) hypertension, CHF , hyperlipidemia,       Neuro/Psych  (+) psychiatric history Anxiety, Depression and Bipolar,    GI/Hepatic/Renal/Endo    (+) obesity,  GERD,  liver disease fatty liver disease, renal disease stones,     Musculoskeletal     Abdominal    Substance History      OB/GYN          Other   arthritis,        Other Comment: Hx of rheumatoid arthritis                Anesthesia Plan    ASA 2     general     intravenous induction     Anesthetic plan, all risks, benefits, and alternatives have been provided, discussed and informed consent has been obtained with: patient.        CODE STATUS:    Code Status (Patient has no pulse and is not breathing): CPR (Attempt to Resuscitate)  Medical Interventions (Patient has pulse or is breathing): Full Support

## 2022-03-09 NOTE — CASE MANAGEMENT/SOCIAL WORK
Discharge Planning Assessment  [facility]     Patient Name: Abby Antoine  MRN: [id number]  Today's Date: 3/9/2022    Admit Date: 3/8/2022     Discharge Needs Assessment     Row Name 03/09/22 1555       Living Environment    People in Home spouse    Name(s) of People in Home Channing Antoine (spouse)    Current Living Arrangements home    Primary Care Provided by self    Provides Primary Care For no one    Family Caregiver if Needed spouse    Family Caregiver Names Channing Antoine (spouse)    Quality of Family Relationships supportive    Able to Return to Prior Arrangements yes       Resource/Environmental Concerns    Resource/Environmental Concerns none       Transition Planning    Patient/Family Anticipates Transition to home with family    Patient/Family Anticipated Services at Transition none    Transportation Anticipated family or friend will provide       Discharge Needs Assessment    Readmission Within the Last 30 Days no previous admission in last 30 days    Equipment Currently Used at Home none    Concerns to be Addressed no discharge needs identified    Equipment Needed After Discharge none    Provided Post Acute Provider List? N/A    Provided Post Acute Provider Quality & Resource List? N/A    Discharge Coordination/Progress Plans to discharge home when ready.              Discharge Plan    Row Name 03/09/22 1558   Plan   Plan Comments SW met with pt at bedside to complete discharge planning. Confirmed address as listed in chart. Pt lives with her spouse, Channing. No equipment used at home. Pt reports no need for any. No need for HH. Pt reports that her spouse will transport at discharge. Pt reports that she hasn't been to a PCP in while. Unsure who her PCP is. No other CM needs identified at this time. SW to continue to follow and assist as needed.        Continued Care and Services - Admitted Since 3/8/2022    Coordination has not been started for this encounter.          Demographic Summary     Row Name  03/09/22 1554       General Information    Admission Type inpatient    Arrived From emergency department    Referral Source admission list    Reason for Consult discharge planning    Preferred Language English               Functional Status     Row Name 03/09/22 1554       Functional Status    Usual Activity Tolerance good       Functional Status, IADL    Medications independent    Meal Preparation independent    Housekeeping independent    Laundry independent    Shopping independent       Mental Status Summary    Recent Changes in Mental Status/Cognitive Functioning no changes               Psychosocial     Row Name 03/09/22 1555       Coping/Stress    Major Change/Loss/Stressor none    Patient Personal Strengths positive attitude;strong support system    Sources of Support spouse    Reaction to Health Status accepting    Understanding of Condition and Treatment adequate understanding of medical condition       Developmental Stage (Eriksson's)    Developmental Stage Stage 7 (35-65 years/Middle Adulthood) Generativity vs. Stagnation               Abuse/Neglect    No documentation.                Legal    No documentation.                Substance Abuse    No documentation.                Patient Forms    No documentation.                   ADRIAN Klein

## 2022-03-09 NOTE — ANESTHESIA POSTPROCEDURE EVALUATION
Patient: Abby Antoine    Procedure Summary     Date: 03/09/22 Room / Location: [facility] /  [facility] OR    Anesthesia Start: 1709 Anesthesia Stop: 1758    Procedure: URETEROSCOPY LASER LITHOTRIPSY WITH STENT INSERTION (Left ) Diagnosis:       Ureteral stone with hydronephrosis      (Ureteral stone with hydronephrosis [N13.2])    Surgeons: Troy Torres MD Provider: Yonatan Galindo CRNA    Anesthesia Type: general ASA Status: 2          Anesthesia Type: general    Vitals  Vitals Value Taken Time   /75 03/09/22 1850   Temp 97.3 °F (36.3 °C) 03/09/22 1800   Pulse 75 03/09/22 1854   Resp 16 03/09/22 1840   SpO2 93 % 03/09/22 1854   Vitals shown include unvalidated device data.        Post Anesthesia Care and Evaluation    Patient location during evaluation: PACU  Patient participation: complete - patient participated  Level of consciousness: awake  Pain score: 2  Pain management: adequate  Airway patency: patent  Anesthetic complications: No anesthetic complications  PONV Status: none  Cardiovascular status: acceptable  Respiratory status: acceptable  Hydration status: acceptable  No anesthesia care post op

## 2022-03-09 NOTE — CONSULTS
In Patient Consult      Patient Name: Abby Antoine  : 1970   MRN: [id number]   Admission Date: 3/8/2022  3:28 AM     Admission Diagnosis: left obstructing nephrolithiasis in solitary kidney (congenital)    Care Team: Patient Care Team:  Abby Manning APRN as PCP - General (Nurse Practitioner)    History of Present Illness: Abby Antoine is a 51 y.o. female who Urology was consulted to see for obstructing left sided nephrolithiasis.     Previous history of Km. Started seeing Dr. Ferrell at [facility] about 6 months ago for further workup of this. He diagnosed her with with left intrarenal nephrolithiatis (up to 6mm stones). She had ESWL on . Did not have a stent. Developed sudden onset, severe left flank and left groin pain on 22, went to [facility] ER and found to have several small ureteral stones. Had associated urinary frequency, dysuria, urgency since ESWL. Has been on cipro until admission. Her pain is currently managed and she is eager for surgery.    Subjective      Review of System: Review of Systems   I have reviewed the ROS documented by my clinical staff and agree. Luisa Loja PA-C    Past Medical History:   Past Medical History:   Diagnosis Date   • Abnormal Pap smear of cervix    • Acid reflux    • Anemia    • Anxiety    • Back pain    • Bipolar disorder ([facility])    • CHF (congestive heart failure) ([facility]) 2017   • Deafness    • Depression    • Fibromyalgia    • Folic acid deficiency    • Gout    • Headache    • History of blood transfusion     at birth   • HTN (hypertension)    • Hyperlipidemia    • Insomnia    • Iron deficiency    • Kidney stone    • Liver disease    • Ovarian cyst    • Panic attack    • PMS (premenstrual syndrome)    • RA (rheumatoid arthritis) ([facility])    • RLS (restless legs syndrome)    • Seasonal allergies    • Sinus problem    • Sleep apnea    • Tattoos    • Trauma    • Urinary tract infection    • Vision problems    • Vitamin B12  deficiency        Past Surgical History:   Past Surgical History:   Procedure Laterality Date   • CERVIX LESION DESTRUCTION     • COLONOSCOPY  8- years or so    [facility]   • TUBAL ABDOMINAL LIGATION  1994       Family History:   Family History   Problem Relation Age of Onset   • Coronary artery disease Father    • Hypertension Father    • Melanoma Father    • Stroke Maternal Grandmother    • Breast cancer Maternal Aunt    • Cervical cancer Maternal Aunt    • Liver cancer Paternal Aunt    • Inflammatory bowel disease Paternal Aunt    • Liver cancer Paternal Uncle    • Liver cancer Paternal Grandfather    • Cirrhosis Maternal Uncle         alcohol induced   • Colon cancer Neg Hx        Social History:   Social History     Socioeconomic History   • Marital status:    Tobacco Use   • Smoking status: Former Smoker     Packs/day: 0.25     Types: Cigarettes     Start date: 1997   • Smokeless tobacco: Never Used   Vaping Use   • Vaping Use: Never used   Substance and Sexual Activity   • Alcohol use: Yes     Comment: social   • Drug use: Never   • Sexual activity: Defer       Medications:     Current Facility-Administered Medications:   •  sennosides-docusate (PERICOLACE) 8.6-50 MG per tablet 2 tablet, 2 tablet, Oral, BID, 2 tablet at 03/08/22 2051 **AND** polyethylene glycol (MIRALAX) packet 17 g, 17 g, Oral, Daily PRN **AND** bisacodyl (DULCOLAX) EC tablet 5 mg, 5 mg, Oral, Daily PRN, 5 mg at 03/08/22 1432 **AND** bisacodyl (DULCOLAX) suppository 10 mg, 10 mg, Rectal, Daily PRN, Hodan Martinez MD  •  cetirizine (zyrTEC) tablet 10 mg, 10 mg, Oral, Daily, Hodan Martinez MD, 10 mg at 03/08/22 1432  •  enoxaparin (LOVENOX) syringe 40 mg, 40 mg, Subcutaneous, Q24H, Hodan Martinez MD, 40 mg at 03/09/22 1426  •  folic acid (FOLVITE) tablet 3 mg, 3 mg, Oral, Daily, Hodan Martinez MD, 3 mg at 03/08/22 1432  •  HYDROcodone-acetaminophen (NORCO) 7.5-325 MG per tablet 1 tablet, 1 tablet, Oral, Q6H PRN,  Hodan Martinez MD, 1 tablet at 03/09/22 0440  •  Morphine sulfate (PF) injection 2 mg, 2 mg, Intravenous, Q4H PRN, Hodan Martinez MD, 2 mg at 03/09/22 0059  •  ondansetron (ZOFRAN) injection 4 mg, 4 mg, Intravenous, Q6H PRN, Hodan Martinez MD, 4 mg at 03/08/22 1200  •  Pharmacy to Dose enoxaparin (LOVENOX), , Does not apply, Continuous PRN, Hodan Martinez MD  •  [COMPLETED] Insert peripheral IV, , , Once **AND** sodium chloride 0.9 % flush 10 mL, 10 mL, Intravenous, PRN, Hodan Martinez MD  •  sodium chloride 0.9 % flush 3 mL, 3 mL, Intravenous, Q12H, Hodan Martinez MD  •  sodium chloride 0.9 % flush 3-10 mL, 3-10 mL, Intravenous, PRN, Hodan Martinez MD  •  sodium chloride 0.9 % infusion, 100 mL/hr, Intravenous, Continuous, Hodan Martinez MD, Last Rate: 100 mL/hr at 03/09/22 0440, 100 mL/hr at 03/09/22 0440    Allergies:   Allergies   Allergen Reactions   • Latex Rash       Objective     Physical Exam:   Vital Signs:   Vitals:    03/09/22 0100 03/09/22 0526 03/09/22 0900 03/09/22 1300   BP: 123/72 125/77 135/90 159/85   BP Location: Right arm Left arm Left arm Left arm   Patient Position: Lying Lying Lying Lying   Pulse: 72 76 69 76   Resp: 16 18 18 16   Temp: 98.3 °F (36.8 °C) 98.3 °F (36.8 °C) 98.6 °F (37 °C) 99.4 °F (37.4 °C)   TempSrc: Oral Oral Oral Oral   SpO2: 98% 97% 98% 98%   Weight:       Height:         Body mass index is 36.39 kg/m².     Physical Exam    Labs:   I/O last 3 completed shifts:  In: 50 [IV Piggyback:50]  Out: 3200 [Urine:3200]    Lab Results   Component Value Date    GLUCOSE 95 03/09/2022    CALCIUM 8.2 (L) 03/09/2022     03/09/2022    K 3.6 03/09/2022    CO2 24.7 03/09/2022     03/09/2022    BUN 7 03/09/2022    CREATININE 0.92 03/09/2022    BCR 7.6 03/09/2022    ANIONGAP 9.3 03/09/2022       Lab Results   Component Value Date    WBC 7.43 03/09/2022    HGB 12.8 03/09/2022    HCT 38.2 03/09/2022    MCV 84.3 03/09/2022     03/09/2022       No results found  for: URINECX    Brief Urine Lab Results  (Last result in the past 365 days)        Color   Clarity   Blood   Leuk Est   Nitrite   Protein   CREAT   Urine HCG        03/09/22 1421               Negative               Radiographic Studies  CT Abdomen Pelvis Stone Protocol    Result Date: 3/8/2022  IMPRESSION: 3 stones in the distal left ureter. One stone in the bladder and 1 small stone in the left renal pelvis. Moderate-severe left hydronephrosis. Nonobstructing left nephrolithiasis. Absent right kidney. Cholelithiasis. Authenticated by Johanne Zeng DO on 03/08/2022 05:49:00 AM      Patient Active Problem List   Diagnosis   • Allergic rhinitis with postnasal drip   • Tobacco dependence syndrome   • Fibromyalgia   • Greater trochanteric pain syndrome   • History of pneumonia   • History of rheumatoid arthritis   • Seropositive rheumatoid arthritis (HCC)   • Swelling   • Fatty (change of) liver, not elsewhere classified   • Class 2 severe obesity due to excess calories with serious comorbidity and body mass index (BMI) of 36.0 to 36.9 in adult (HCC)   • Elevated triglycerides with high cholesterol   • Pruritus   • History of methotrexate therapy   • Calculus of gallbladder without cholecystitis without obstruction   • Gastroesophageal reflux disease without esophagitis   • Ureteral stone with hydronephrosis       Assessment / Plan      Assessment/Plan:   Diagnoses and all orders for this visit:    1. Ureteral stone with hydronephrosis (Primary)  -     HYDROcodone-acetaminophen (NORCO) 7.5-325 MG per tablet; Take 1 tablet by mouth Every 4 (Four) Hours As Needed for Severe Pain .  Dispense: 10 tablet; Refill: 0  -     Case Request; Standing  -     Case Request    2. Non-intractable vomiting with nausea, unspecified vomiting type    3. Renal colic on left side    Other orders  -     Urinalysis With Microscopic If Indicated (No Culture) - Urine, Clean Catch; Standing  -     Urinalysis With Microscopic If Indicated (No  Culture) - Urine, Clean Catch  -     CBC & Differential; Standing  -     Comprehensive Metabolic Panel; Standing  -     Lipase; Standing  -     Insert peripheral IV; Standing  -     sodium chloride 0.9 % flush 10 mL  -     CT Abdomen Pelvis Stone Protocol; Standing  -     ondansetron (ZOFRAN) injection 4 mg  -     ketorolac (TORADOL) injection 30 mg  -     CBC & Differential  -     Comprehensive Metabolic Panel  -     Lipase  -     Insert peripheral IV  -     CT Abdomen Pelvis Stone Protocol  -     Urinalysis, Microscopic Only - Urine, Clean Catch; Standing  -     Urinalysis, Microscopic Only - Urine, Clean Catch  -     Morphine sulfate (PF) injection 4 mg  -     ondansetron (ZOFRAN) injection 4 mg  -     Follow Anesthesia Guidelines / Standing Orders; Standing  -     Follow Anesthesia Guidelines / Standing Orders; Standing  -     Verify NPO Status; Standing  -     Obtain Informed Consent; Standing  -     Verify / Perform Chlorhexidine Skin Prep; Standing  -     Perform Chlorhexidine Skin Prep Night Before and Morning of Procedure; Standing  -     sodium chloride 0.9 % infusion  -     ceFAZolin Sodium-Dextrose (ANCEF) IVPB (duplex) 2 g  -     Discontinue: acetaminophen (TYLENOL) tablet 975 mg  -     COVID PRE-OP / PRE-PROCEDURE SCREENING ORDER (NO ISOLATION) - Swab, Nasopharynx; Standing  -     Follow Anesthesia Guidelines / Standing Orders  -     Follow Anesthesia Guidelines / Standing Orders  -     Verify NPO Status  -     Obtain Informed Consent  -     Verify / Perform Chlorhexidine Skin Prep  -     Perform Chlorhexidine Skin Prep Night Before and Morning of Procedure  -     COVID PRE-OP / PRE-PROCEDURE SCREENING ORDER (NO ISOLATION) - Swab, Nasal Cavity  -     Initiate Observation Status; Standing  -     Initiate Observation Status  -     HYDROmorphone (DILAUDID) injection 1 mg  -     Morphine sulfate (PF) injection 2 mg  -     ondansetron (ZOFRAN) injection 4 mg  -     Cancel: Diet Regular; Standing  -      Cancel: Diet Regular  -     cetirizine (zyrTEC) tablet 10 mg  -     folic acid (FOLVITE) tablet 3 mg  -     Vital Signs; Standing  -     Up With Assistance; Standing  -     Intake & Output; Standing  -     Weigh patient; Standing  -     Incentive Spirometry; Standing  -     Oxygen Therapy- Nasal Cannula; Titrate for SPO2: 90%; Standing  -     Basic Metabolic Panel; Standing  -     CBC Auto Differential; Standing  -     Insert Peripheral IV; Standing  -     Saline Lock & Maintain IV Access; Standing  -     sodium chloride 0.9 % flush 3 mL  -     sodium chloride 0.9 % flush 3-10 mL  -     Discontinue: acetaminophen (TYLENOL) tablet 650 mg  -     Discontinue: acetaminophen (TYLENOL) 160 MG/5ML solution 650 mg  -     Discontinue: acetaminophen (TYLENOL) suppository 650 mg  -     Place Sequential Compression Device; Standing  -     Maintain Sequential Compression Device; Standing  -     Code Status and Medical Interventions:; Standing  -     Pharmacy to Dose enoxaparin (LOVENOX)  -     Ambulate Patient; Standing  -     Fall Precautions; Standing  -     Notify Provider (With Default Parameters); Standing  -     Cancel: Diet Regular; Standing  -     NPO Diet; Standing  -     Inpatient Urology Consult; Standing  -     Vital Signs  -     Vital Signs  -     Vital Signs  -     Intake & Output  -     Weigh patient  -     Incentive Spirometry  -     Incentive Spirometry  -     Incentive Spirometry  -     Incentive Spirometry  -     Incentive Spirometry  -     Insert Peripheral IV  -     Saline Lock & Maintain IV Access  -     Place Sequential Compression Device  -     Maintain Sequential Compression Device  -     Code Status and Medical Interventions:  -     Ambulate Patient  -     Fall Precautions  -     Notify Provider (With Default Parameters)  -     Cancel: Diet Regular  -     Inpatient Urology Consult  -     enoxaparin (LOVENOX) syringe 40 mg  -     HYDROcodone-acetaminophen (NORCO) 7.5-325 MG per tablet 1 tablet  -      Incentive Spirometry  -     HYDROcodone-acetaminophen (NORCO) 7.5-325 MG per tablet 1 tablet  -     sennosides-docusate (PERICOLACE) 8.6-50 MG per tablet 2 tablet  -     polyethylene glycol (MIRALAX) packet 17 g  -     bisacodyl (DULCOLAX) EC tablet 5 mg  -     bisacodyl (DULCOLAX) suppository 10 mg  -     Apply Heat To Affected Area; Standing  -     Apply Heat To Affected Area  -     Incentive Spirometry  -     Incentive Spirometry  -     Basic Metabolic Panel  -     CBC Auto Differential  -     NPO Diet  -     Vital Signs  -     Vital Signs  -     Vital Signs  -     Vital Signs  -     Vital Signs  -     Vital Signs  -     Intake & Output  -     Intake & Output  -     Ambulate Patient  -     Ambulate Patient  -     Incentive Spirometry  -     Incentive Spirometry  -     POC Glucose Once; Standing  -     POC Glucose Once  -     Pregnancy, Urine - Urine, Clean Catch; Standing  -     Pregnancy, Urine - Urine, Clean Catch  -     Incentive Spirometry         AS ABOVE, TO OR FOR LEFT URS/LL with stent. I have discussed URS and stent with the patient. She understands the procedure. She understands that there is a risk of bleeding, damage to organs, infection, and cardiopulmonary complications of general anesthesia as with any surgery. She states understanding of those risks and consents to URS.      Follow Up:   FU 1 week after surgery in-office for cysto with stent pull      Luisa Loja PA-C  [facility]

## 2022-03-09 NOTE — PROGRESS NOTES
[facility]    PROGRESS NOTE    Name:  Abby Antoine   Age:  51 y.o.  Sex:  female  :  1970  MRN:  [id number]   Visit Number:  [id number]  Admission Date:  3/8/2022  Date Of Service:  22  Primary Care Physician:  Abby Manning APRN     LOS: 0 days :    Chief Complaint:      Left flank pain    Subjective:    Patient was seen and examined today.  Patient is n.p.o. for surgery by Dr. Torres today.  Patient appears comfortable and denies any pain currently and reports that her pain has been controlled overnight.  Patient denied any nausea and vomiting.   at bedside.  Patient denies any acute complaints at this time except for being hungry since she is n.p.o.     Hospital Course:    Patient is a 51 years old female with past medical history of solitary kidney, nephrolithiasis, anxiety, bipolar, CHF, fibromyalgia, rheumatoid arthritis, restless leg syndrome and sleep apnea who presented to the ER with a chief complaint of left-sided flank pain.  Her pain started suddenly last night and was severe rated as 10 out of 10 and persisted until she arrived to the ER.  Patient had lithotripsy done at [facility] 3 days ago and was discharged on antibiotics but patient does not remember which one. Patient reported that she was doing well after the procedure until the pain started again last night.  Patient had nausea and vomited 3 times.  No fever or chills.  Patient reported that she was unable to urinate after her pain started.  However she denied any dysuria or burning on urination prior to her pain started.  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  Past surgical history include tubal ligation.  Patient denies any history of diabetes, hypertension or heart disease.     Upon ER evaluation, patient was initially hypertensive with a blood pressure of 195/115 which has improved after pain treatment.  Otherwise patient was afebrile with other  vitals stable. Labs were significant for sodium of 133, glucose 133 otherwise unremarkable.  Urinalysis showed RBC 31-50, WBC 0-2, nitrates and leukocytes negative.  CT abdomen pelvis showed Two closely adjacent stones in the distal left ureter measuring up to 2 mm located 2 cm proximal to the ureterovesicular junction. 2.2 mm stone at the left ureterovesicular junction. 1.3 mm stone in the posterior lateral left bladder lumen. Moderate-severe left hydronephrosis. Left perinephric fat stranding. Left kidney is edematous. At least 6 nonobstructing stones measuring up to 5.6 mm within the left kidney. 1.9 mm nonobstructing stone in the posterior dependent dilated left renal pelvis. Right kidney is absent. Cholelithiasis.    Patient received Ancef, Dilaudid, Toradol and morphine in addition to Zofran and was started on IV fluids while in the ER. Dr. Torres was consulted by ER provider and recommended admission, n.p.o. after midnight and pain control.  Hospitalist consulted for admission, further evaluation treatment.    Review of Systems:     All systems were reviewed and negative except as mentioned in subjective, assessment and plan.    Vital Signs:    Temp:  [98.3 °F (36.8 °C)-99.7 °F (37.6 °C)] 99.4 °F (37.4 °C)  Heart Rate:  [69-87] 76  Resp:  [16-18] 16  BP: (123-169)/(72-92) 159/85    Intake and output:    I/O last 3 completed shifts:  In: 50 [IV Piggyback:50]  Out: 3200 [Urine:3200]  I/O this shift:  In: -   Out: 800 [Urine:800]    Physical Examination:    General Appearance:  Alert and cooperative.  No acute distress.   Head:  Atraumatic and normocephalic.   Eyes: Conjunctivae and sclerae normal, no icterus. No pallor.   Throat: No oral lesions, no thrush, oral mucosa moist.   Neck: Supple, trachea midline, no thyromegaly.   Lungs:   Breath sounds heard bilaterally equally.  No wheezing or crackles. No Pleural rub or bronchial breathing.   Heart:  Normal S1 and S2, no murmur, no gallop, no rub. No JVD.    Abdomen:   Normal bowel sounds, no masses, no organomegaly. Soft, nontender, nondistended, no rebound tenderness.  No CVA tenderness.   Extremities: Supple, no edema, no cyanosis, no clubbing.   Skin: No bleeding or rash.   Neurologic: Alert and oriented x 3. No facial asymmetry. Moves all four limbs. No tremors.      Laboratory results:    Results from last 7 days   Lab Units 03/09/22  0604 03/08/22  0349   SODIUM mmol/L 141 133*   POTASSIUM mmol/L 3.6 3.8   CHLORIDE mmol/L 107 99   CO2 mmol/L 24.7 17.8*   BUN mg/dL 7 11   CREATININE mg/dL 0.92 0.96   CALCIUM mg/dL 8.2* 8.5*   BILIRUBIN mg/dL  --  0.3   ALK PHOS U/L  --  89   ALT (SGPT) U/L  --  13   AST (SGOT) U/L  --  20   GLUCOSE mg/dL 95 133*     Results from last 7 days   Lab Units 03/09/22  0604 03/08/22  0349   WBC 10*3/mm3 7.43 9.35   HEMOGLOBIN g/dL 12.8 14.1   HEMATOCRIT % 38.2 41.1   PLATELETS 10*3/mm3 235 283                     I have reviewed the patient's laboratory results.    Radiology results:    CT Abdomen Pelvis Stone Protocol    Result Date: 3/8/2022  FINAL REPORT TECHNIQUE: null CLINICAL HISTORY: Severe left flank pain status post lithotripsy 4 days ago COMPARISON: null FINDINGS: CT abdomen and pelvis without contrast. Comparison: None. Technique: Unenhanced axial sections with multiplanar reformats. Findings: Two closely adjacent stones in the distal left ureter measuring up to 2 mm located 2 cm proximal to the ureterovesicular junction. 2.2 mm stone at the left ureterovesicular junction. 1.3 mm stone in the posterior lateral left bladder lumen. Moderate-severe left hydronephrosis. Left perinephric fat stranding. Left kidney is edematous. At least 6 nonobstructing stones measuring up to 5.6 mm within the left kidney. 1.9 mm nonobstructing stone in the posterior dependent dilated left renal pelvis. Right kidney is absent. Several rim calcified stones in the distal gallbladder measuring up to 19 mm. No CT evidence of cholecystitis. Normal  liver, common bile duct, pancreas, spleen, and adrenal glands. Remaining pelvic organs are normal. Aorta is normal caliber. No bowel obstruction. No inflammatory bowel changes. Normal appendix. No free intraperitoneal air or free fluid. Lung bases are clear. No suspicious bony lesions.     Impression: IMPRESSION: 3 stones in the distal left ureter. One stone in the bladder and 1 small stone in the left renal pelvis. Moderate-severe left hydronephrosis. Nonobstructing left nephrolithiasis. Absent right kidney. Cholelithiasis. Authenticated by Johanne Zeng DO on 03/08/2022 05:49:00 AM    I have reviewed the patient's radiology reports.    Medication Review:     I have reviewed the patient's active and prn medications.     Problem List:      Ureteral stone with hydronephrosis      Assessment:    1. Nephrolithiasis with hydronephrosis, POA  2. Intractable nausea and vomiting, POA  3. Solitary kidney, congenital  4. Hx of nephrolithiasis, anxiety, bipolar, CHF, fibromyalgia, rheumatoid arthritis, restless leg syndrome and sleep apnea     Plan:    Patient is admitted for further evaluation and treatment.  Dr. Torres is consulted and appreciate his recommendations.  Patient is scheduled for OR later today.  Pain is currently controlled. We will continue with pain control and IV fluids for hydration.  Zofran as needed.  No signs of UTI, urine negative for infection. We will hold off on further antibiotics at this time. will continue to monitor.  Patient is n.p.o. since midnight.      DVT Prophylaxis: Lovenox  Code Status: Full  Diet: Regular, n.p.o. since midnight  Discharge Plan: Pending    Hodan Martinez MD  03/09/22  13:56 EST    Dictated utilizing Dragon dictation.

## 2022-03-09 NOTE — OP NOTE
Preoperative diagnosis  Left ureteral and renal stones    Postoperative diagnosis  Left renal stones    Procedure performed  1.  Flexible cystoscopy  2.  left retrograde pyelogram  3.  left ureteroscopy with laser lithotripsy (06289)  4.  left ureteral stent placement 6 Fr x 26 cm  5.  Fluoroscopy time < 1 hour    Surgeon  Troy Torres MD    Anesthesia  General    Complications  None    Specimen  Stone fragments for biochemical analysis    EBL  Minimal    Findings  Cystoscopy revealed no tumors, stones or other mucosal abnormalities.  Retrograde pyelogram revealed a delicate system with identification of the stones seen on imaging.    Ureteroscopy revealed that all the stones in the ureter had passed, however there was a pile of stones within the kidney.  All of these were lasered.     Indications  51 y.o. female with a history of ESWL 1 week ago with obstructing fragments left hydronephrosis and intractable pain agreed to undergo the above named procedure after discussion of the alternatives, risks and benefits. Informed consent was obtained.      Procedure  The patient was taken to the operating room and placed supine on the operating table.  Pre-operative antibiotics were administered.  Bilateral lower extremity SCDs were placed.  After induction of general anesthesia the patient was positioned in dorsal lithotomy, prepped and draped in a sterile fashion.  A time-out was performed.      A 14 Vietnamese flexible cystoscope was passed carefully via urethra into the bladder.  The left ureteral orifice was identified and a Sensor wire was passed retrograde to the level of the kidney and confirmed by fluoroscopy.  The flexible scope was off-loaded and the bladder emptied with a straight catheter.  An 8-10 coaxial dilator was passed without difficulty and then removed.  The semirigid ureteroscope was passed carefully along the Sensor wire through the urethra and into the distal ureter.  The entire ureter was inspected  with the semirigid ureteroscope, and a retrograde pyelogram, Super Stiff wire, and ureteral access sheath were placed up to the level of the kidney and the kidney was entered with the flexible ureteroscope.  Multiple stones were identified and lasered with settings of 0.2 J and 100 Hz with the thulium laser fiber.     At the completion of the procedure, all clinically significant fragments were removed from the ureter and only dust-like fragments remained.  The ureteroscope was withdrawn. A retrograde pyelogram was performed by slowly injecting 5 mL of 50% Omnipaque contrast.  A 6 Fr x 26 cm stent was positioned with the upper end in the upper pole and the lower in the bladder confirmed by fluoroscopy. The bladder was emptied and the procedure was complete. The patient tolerated the procedure well and was stable throughout.    The patient will remove stent at home in 1 week.  Patient can be discharged later tonight if she desires.  We will have her follow-up with me in 8 weeks with a renal ultrasound.

## 2022-03-09 NOTE — DISCHARGE INSTRUCTIONS
Home Care After Ureteroscopy and Laser Lithotripsy  The following instructions will help you care for yourself, or be cared for upon your return home today.    These are guidelines for your care right after surgery only.     Diet  Drink plenty of liquids and eat light meals today.    Start your regular diet tomorrow.    Activity  Start normal activities in twenty-four (24) hours.    Wound Care and Hygiene  No restrictions, start normal routine.    Anesthesia Precautions & Expectations  After anesthesia, rest for 24 hours.    Do not drive, drink alcoholic beverages or make any important decisions during this time.  General anesthesia may cause a sore throat, jaw discomfort or muscle aches.    These symptoms can last for one or two days.     What to Expect after Surgery  Mild pain with voiding.  Frequency or urgency.  Bladder cramps.  Minimal bleeding with voiding.    Call your Doctor  Passing clots in urine preventing bladder emptying  Severe pain not controlled by oral medication  Temperature above 101.5 degrees  Inability to urinate within eight (8) hours after surgery    After Stent Placement  It is common to have blood tinged urine for 3-5 days.  It is common to have pain in your side and in your back when you urinate for 3-5 days.  It is common to have urgency with urination.  This is a temporary stent and you will remove at home in 1 week from your surgery.     Other Contacts  Urology Office:  [address] [facility] [id number]   [place] [id number] [phone] office  [phone] fax    Other Instructions  Take tamsulosin daily until the stent comes out.  Take oxybutynin daily as needed for bladder spasm while the stent is in.  Take Pyridium up to 3 times daily as needed for burning with urination.   Take Tylenol scheduled every 6 hours for the first 3 days.  Take the oxycodone on top of that as needed.  Take all of the antibiotics.     To remove the stent, take off the wrapping, grasp the string, and pull  until you see both curly ends of the blue plastic stent come out.  Take some pain medication before you do this.  Do this in the morning so that you can call the office if there are issues.    Follow up Appointment  Please call Dr. Torres's office to schedule follow-up appointment in 8 weeks with kidney ultrasound beforehand.

## 2022-03-09 NOTE — PLAN OF CARE
Goal Outcome Evaluation:  Plan of Care Reviewed With: patient        Progress: improving  Outcome Evaluation: improved pain control with alternating medication. able to void; has urgency and dribbling. adequate output.vss. npo for procedure.

## 2022-03-09 NOTE — ANESTHESIA PROCEDURE NOTES
Airway  Urgency: elective    Date/Time: 3/9/2022 5:13 PM  Airway not difficult    General Information and Staff    Patient location during procedure: OR  CRNA: Yonatan Galindo CRNA    Indications and Patient Condition  Indications for airway management: airway protection    Preoxygenated: yes  Mask difficulty assessment: 1 - vent by mask    Final Airway Details  Final airway type: supraglottic airway      Successful airway: unique  Size 4    Number of attempts at approach: 1  Assessment: lips, teeth, and gum same as pre-op and atraumatic intubation

## 2022-03-10 VITALS
OXYGEN SATURATION: 96 % | SYSTOLIC BLOOD PRESSURE: 120 MMHG | HEART RATE: 89 BPM | RESPIRATION RATE: 18 BRPM | DIASTOLIC BLOOD PRESSURE: 71 MMHG | WEIGHT: 212 LBS | BODY MASS INDEX: 36.19 KG/M2 | HEIGHT: 64 IN | TEMPERATURE: 97.6 F

## 2022-03-10 PROCEDURE — 63710000001 OXYBUTYNIN XL 5 MG TABLET SUSTAINED-RELEASE 24 HOUR: Performed by: FAMILY MEDICINE

## 2022-03-10 PROCEDURE — A9270 NON-COVERED ITEM OR SERVICE: HCPCS | Performed by: UROLOGY

## 2022-03-10 PROCEDURE — 63710000001 TAMSULOSIN 0.4 MG CAPSULE: Performed by: FAMILY MEDICINE

## 2022-03-10 PROCEDURE — 63710000001 SENNOSIDES-DOCUSATE 8.6-50 MG TABLET: Performed by: UROLOGY

## 2022-03-10 PROCEDURE — 25010000002 MORPHINE SULFATE (PF) 2 MG/ML SOLUTION: Performed by: UROLOGY

## 2022-03-10 PROCEDURE — 63710000001 OXYCODONE 5 MG TABLET: Performed by: FAMILY MEDICINE

## 2022-03-10 PROCEDURE — 63710000001 PHENAZOPYRIDINE 100 MG TABLET: Performed by: FAMILY MEDICINE

## 2022-03-10 PROCEDURE — A9270 NON-COVERED ITEM OR SERVICE: HCPCS | Performed by: FAMILY MEDICINE

## 2022-03-10 PROCEDURE — 99217 PR OBSERVATION CARE DISCHARGE MANAGEMENT: CPT | Performed by: FAMILY MEDICINE

## 2022-03-10 PROCEDURE — 63710000001 CETIRIZINE 10 MG TABLET: Performed by: UROLOGY

## 2022-03-10 PROCEDURE — 63710000001 FOLIC ACID 1 MG TABLET: Performed by: UROLOGY

## 2022-03-10 PROCEDURE — G0378 HOSPITAL OBSERVATION PER HR: HCPCS

## 2022-03-10 PROCEDURE — 63710000001 HYDROCODONE-ACETAMINOPHEN 7.5-325 MG TABLET: Performed by: UROLOGY

## 2022-03-10 RX ORDER — TAMSULOSIN HYDROCHLORIDE 0.4 MG/1
0.4 CAPSULE ORAL DAILY
Status: DISCONTINUED | OUTPATIENT
Start: 2022-03-10 | End: 2022-03-10 | Stop reason: HOSPADM

## 2022-03-10 RX ORDER — OXYCODONE HYDROCHLORIDE 5 MG/1
5 TABLET ORAL EVERY 4 HOURS PRN
Status: DISCONTINUED | OUTPATIENT
Start: 2022-03-10 | End: 2022-03-10 | Stop reason: HOSPADM

## 2022-03-10 RX ORDER — PHENAZOPYRIDINE HYDROCHLORIDE 100 MG/1
100 TABLET, FILM COATED ORAL
Status: DISCONTINUED | OUTPATIENT
Start: 2022-03-10 | End: 2022-03-10 | Stop reason: HOSPADM

## 2022-03-10 RX ORDER — OXYBUTYNIN CHLORIDE 5 MG/1
10 TABLET, EXTENDED RELEASE ORAL DAILY
Status: DISCONTINUED | OUTPATIENT
Start: 2022-03-10 | End: 2022-03-10 | Stop reason: HOSPADM

## 2022-03-10 RX ADMIN — OXYBUTYNIN CHLORIDE 10 MG: 5 TABLET, EXTENDED RELEASE ORAL at 08:36

## 2022-03-10 RX ADMIN — PHENAZOPYRIDINE HYDROCHLORIDE 100 MG: 100 TABLET ORAL at 08:36

## 2022-03-10 RX ADMIN — MORPHINE SULFATE 2 MG: 2 INJECTION, SOLUTION INTRAMUSCULAR; INTRAVENOUS at 00:08

## 2022-03-10 RX ADMIN — TAMSULOSIN HYDROCHLORIDE 0.4 MG: 0.4 CAPSULE ORAL at 08:36

## 2022-03-10 RX ADMIN — FOLIC ACID 3 MG: 1 TABLET ORAL at 08:36

## 2022-03-10 RX ADMIN — SENNOSIDES AND DOCUSATE SODIUM 2 TABLET: 50; 8.6 TABLET ORAL at 08:35

## 2022-03-10 RX ADMIN — HYDROCODONE BITARTRATE AND ACETAMINOPHEN 1 TABLET: 7.5; 325 TABLET ORAL at 05:19

## 2022-03-10 RX ADMIN — CETIRIZINE HYDROCHLORIDE 10 MG: 10 TABLET, FILM COATED ORAL at 08:36

## 2022-03-10 RX ADMIN — OXYCODONE HYDROCHLORIDE 5 MG: 5 TABLET ORAL at 02:39

## 2022-03-10 NOTE — CASE MANAGEMENT/SOCIAL WORK
Case Management Discharge Note           Provided Post Acute Provider List?: N/A  Provided Post Acute Provider Quality & Resource List?: N/A    Selected Continued Care - Discharged on 3/10/2022 Admission date: 3/8/2022 - Discharge disposition: Home or Self Care    Destination    No services have been selected for the patient.              Durable Medical Equipment    No services have been selected for the patient.              Dialysis/Infusion    No services have been selected for the patient.              Home Medical Care    No services have been selected for the patient.              Therapy    No services have been selected for the patient.              Community Resources    No services have been selected for the patient.              Community & DME    No services have been selected for the patient.                  Transportation Services  Private: Car    Final Discharge Disposition Code: 01 - home or self-care

## 2022-03-10 NOTE — DISCHARGE SUMMARY
[facility]   DISCHARGE SUMMARY      Name:  Abby Antoine   Age:  51 y.o.  Sex:  female  :  1970  MRN:  [id number]   Visit Number:  [id number]    Admission Date:  3/8/2022  Date of Discharge:  3/10/2022  Primary Care Physician:  Abby Manning APRN    Important issues to note:    -Patient with nephrolithiasis had cystoscopy, left ureteroscopy with laser lithotripsy and left ureteral stent placement by Dr. Torres on 3/9 and tolerated and recovered well.      -Patient was discharged home on Cipro, oxycodone, tamsulosin and oxybutynin per urology recommendations.    -Patient is to follow-up with urology in the office for stent removal in 1 week.  And in 8 weeks again for renal ultrasound.    -Patient follow-up with her PCP in 2 to 3 days for recheck.    Discharge Diagnoses:   1. Nephrolithiasis with hydronephrosis, POA  2. Intractable nausea and vomiting, POA  3. Solitary kidney, congenital  4. Anxiety  5. Bipolar  6. CHF  7. Fibromyalgia  8. rheumatoid arthritis  9. restless leg syndrome  10. sleep apnea       Problem List:     Active Hospital Problems    Diagnosis  POA   • Ureteral stone with hydronephrosis [N13.2]  Yes      Resolved Hospital Problems   No resolved problems to display.     Presenting Problem:    Chief Complaint   Patient presents with   • Flank Pain   • Post-op Problem      Consults:     Consulting Physician(s)  Chat With All Active Members    Provider Relationship Specialty    Troy Torres MD  Consulting Physician Urology        Procedures Performed:    Procedure(s):  URETEROSCOPY LASER LITHOTRIPSY WITH STENT INSERTION    History of presenting illness/Hospital Course:    Patient is a 51 years old female with past medical history of solitary kidney, nephrolithiasis, anxiety, bipolar, CHF, fibromyalgia, rheumatoid arthritis, restless leg syndrome and sleep apnea who presented to the ER with a chief complaint of left-sided flank pain.  Her pain  started suddenly last night and was severe rated as 10 out of 10 and persisted until she arrived to the ER.  Patient had lithotripsy done at [facility] 3 days ago. 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      Upon ER evaluation, patient was initially hypertensive with a blood pressure of 195/115 which has improved after pain treatment.  Otherwise patient was afebrile with other vitals stable. Labs were significant for sodium of 133, glucose 133 otherwise unremarkable.  Urinalysis showed RBC 31-50, WBC 0-2, nitrates and leukocytes negative.  CT abdomen pelvis showed Two closely adjacent stones in the distal left ureter measuring up to 2 mm located 2 cm proximal to the ureterovesicular junction. 2.2 mm stone at the left ureterovesicular junction. 1.3 mm stone in the posterior lateral left bladder lumen. Moderate-severe left hydronephrosis. Left perinephric fat stranding. Left kidney is edematous. At least 6 nonobstructing stones measuring up to 5.6 mm within the left kidney. 1.9 mm nonobstructing stone in the posterior dependent dilated left renal pelvis. Right kidney is absent.     Pain was controlled and was started on IV fluids for hydration.  Dr. Torres is consulted.  Dr. Torres performed a cystoscopy, left ureteroscopy with laser lithotripsy and left ureteral stent placement on 3/9.  Patient tolerated well and recovered well after surgery.  Patient was seen and examined on 3/10 in the morning on the day of discharge.  Sitting up comfortably in bed with no distress or pain.  Patient reports no pain currently.  Patient with no nausea or vomiting and tolerating p.o. well.  Patient was eager to go home on the day of discharge.     Vital Signs:    Temp:  [97.3 °F (36.3 °C)-99.4 °F (37.4 °C)] 97.6 °F (36.4 °C)  Heart Rate:  [76-98] 89  Resp:  [14-18] 18  BP: (106-159)/(64-86) 120/71    Physical Exam:    General Appearance:  Alert and cooperative.  No acute distress.   Head:  Atraumatic and  normocephalic.   Eyes: Conjunctivae and sclerae normal, no icterus. No pallor.   Ears:  Ears with no abnormalities noted.   Throat: No oral lesions, no thrush, oral mucosa moist.   Neck: Supple, trachea midline, no thyromegaly.   Back:   No kyphoscoliosis present. No tenderness to palpation.   Lungs:   Breath sounds heard bilaterally equally.  No crackles or wheezing. No Pleural rub or bronchial breathing.   Heart:  Normal S1 and S2, no murmur, no gallop, no rub. No JVD.   Abdomen:   Normal bowel sounds, no masses, no organomegaly. Soft, nontender, nondistended, no rebound tenderness.   Extremities: Supple, no edema, no cyanosis, no clubbing.   Pulses: Pulses palpable bilaterally.   Skin: No bleeding or rash.   Neurologic: Alert and oriented x 3. No facial asymmetry. Moves all four limbs. No tremors.     Pertinent Lab Results:     Results from last 7 days   Lab Units 03/09/22  0604 03/08/22  0349   SODIUM mmol/L 141 133*   POTASSIUM mmol/L 3.6 3.8   CHLORIDE mmol/L 107 99   CO2 mmol/L 24.7 17.8*   BUN mg/dL 7 11   CREATININE mg/dL 0.92 0.96   CALCIUM mg/dL 8.2* 8.5*   BILIRUBIN mg/dL  --  0.3   ALK PHOS U/L  --  89   ALT (SGPT) U/L  --  13   AST (SGOT) U/L  --  20   GLUCOSE mg/dL 95 133*     Results from last 7 days   Lab Units 03/09/22  0604 03/08/22  0349   WBC 10*3/mm3 7.43 9.35   HEMOGLOBIN g/dL 12.8 14.1   HEMATOCRIT % 38.2 41.1   PLATELETS 10*3/mm3 235 283                     Results from last 7 days   Lab Units 03/08/22  0349   LIPASE U/L 11*               Pertinent Radiology Results:    Imaging Results (All)     Procedure Component Value Units Date/Time    CT Abdomen Pelvis Stone Protocol [[id number]] Collected: 03/08/22 0549     Updated: 03/08/22 0551    Narrative:      FINAL REPORT    TECHNIQUE:  null    CLINICAL HISTORY:  Severe left flank pain status post lithotripsy 4 days ago    COMPARISON:  null    FINDINGS:  CT abdomen and pelvis without contrast.    Comparison: None.    Technique: Unenhanced axial  sections with multiplanar reformats.    Findings:    Two closely adjacent stones in the distal left ureter measuring up to 2 mm located 2 cm proximal to the ureterovesicular junction. 2.2 mm stone at the left ureterovesicular junction. 1.3 mm stone in the posterior lateral left bladder lumen.   Moderate-severe left hydronephrosis. Left perinephric fat stranding. Left kidney is edematous. At least 6 nonobstructing stones measuring up to 5.6 mm within the left kidney. 1.9 mm nonobstructing stone in the posterior dependent dilated left renal   pelvis. Right kidney is absent.    Several rim calcified stones in the distal gallbladder measuring up to 19 mm. No CT evidence of cholecystitis. Normal liver, common bile duct, pancreas, spleen, and adrenal glands.    Remaining pelvic organs are normal.    Aorta is normal caliber.    No bowel obstruction. No inflammatory bowel changes. Normal appendix.    No free intraperitoneal air or free fluid.    Lung bases are clear. No suspicious bony lesions.      Impression:      IMPRESSION:    3 stones in the distal left ureter. One stone in the bladder and 1 small stone in the left renal pelvis. Moderate-severe left hydronephrosis.    Nonobstructing left nephrolithiasis.    Absent right kidney.    Cholelithiasis.    Authenticated by Johanne Zeng DO on 03/08/2022 05:49:00 AM          Echo:      Condition on Discharge:      Stable.    Code status during the hospital stay:    Code Status and Medical Interventions:   Ordered at: 03/08/22 1234     Code Status (Patient has no pulse and is not breathing):    CPR (Attempt to Resuscitate)     Medical Interventions (Patient has pulse or is breathing):    Full Support     Discharge Disposition:    Home or Self Care    Discharge Medications:       Discharge Medications      New Medications      Instructions Start Date   Acetaminophen Extra Strength 500 MG tablet  Generic drug: acetaminophen   1,000 mg, Oral, Every 6 Hours      ciprofloxacin  500 MG tablet  Commonly known as: Cipro   500 mg, Oral, Daily      docusate sodium 100 MG capsule  Commonly known as: Colace   Take 1 capsule by mouth 2 (Two) Times a Day. If taking oxycodone      oxybutynin XL 10 MG 24 hr tablet  Commonly known as: Ditropan XL   Take 1 tablet by mouth Daily As Needed for bladder spasm      oxyCODONE 5 MG immediate release tablet  Commonly known as: Roxicodone   5 mg, Oral, Every 6 Hours PRN      phenazopyridine 100 MG tablet  Commonly known as: PYRIDIUM   Take 1 tablet by mouth 3 (Three) Times a Day As Needed for urinary burning      tamsulosin 0.4 MG capsule 24 hr capsule  Commonly known as: FLOMAX   0.4 mg, Oral, Nightly         Continue These Medications      Instructions Start Date   DIFLUCAN PO   Oral, Once      ergocalciferol 1.25 MG (58208 UT) capsule  Commonly known as: ERGOCALCIFEROL   Vitamin D2 1,250 mcg (50,000 unit) capsule   TAKE 1 CAPSULE EVERY WEEK      folic acid 1 MG tablet  Commonly known as: FOLVITE   3 mg, Oral, Daily      Humira Pen 40 MG/0.8ML Pen-injector Kit  Generic drug: Adalimumab   40 mg      loratadine 10 MG tablet  Commonly known as: CLARITIN   10 mg, Oral, Daily      methotrexate 2.5 MG tablet   5 mg, Weekly      multivitamin tablet tablet   1 tablet, Oral, Daily           Discharge Diet:       Activity at Discharge:       Follow-up Appointments:     Follow-up Information     Abby Manning APRN.    Specialty: Nurse Practitioner  Contact information:  [address]  [place] [address] [phone]             Boston Ferrell MD.    Specialty: Urology  Contact information:  [id number]  [facility] [address] [phone]                       No future appointments.  Test Results Pending at Discharge:           Hodan Martinez MD  03/10/22  10:38 EST    Time: I spent 28 minutes on this discharge activity which included: face-to-face encounter with the patient, reviewing the data in the system, coordination of the care  with the nursing staff as well as consultants, documentation, and entering orders.     Dictated utilizing Dragon dictation.

## 2022-03-11 ENCOUNTER — READMISSION MANAGEMENT (OUTPATIENT)
Dept: CALL CENTER | Facility: HOSPITAL | Age: 52
End: 2022-03-11

## 2022-03-11 NOTE — OUTREACH NOTE
Prep Survey    Flowsheet Row Responses   [facility] [place] [facility] discharged from? [place]   Is LACE score < 7 ? No   Emergency Room discharge w/ pulse ox? No   Eligibility Readm Mgmt   Discharge diagnosis -Patient with nephrolithiasis had cystoscopy, left ureteroscopy with laser lithotripsy and left ureteral stent placement by Dr. Torres on 3/9 and tolerated and recovered well.    Does the patient have one of the following disease processes/diagnoses(primary or secondary)? General Surgery   Does the patient have Home health ordered? No   Is there a DME ordered? No   Prep survey completed? Yes          HAYLEE SANTOS - Registered Nurse

## 2022-03-15 ENCOUNTER — READMISSION MANAGEMENT (OUTPATIENT)
Dept: CALL CENTER | Facility: HOSPITAL | Age: 52
End: 2022-03-15

## 2022-03-15 NOTE — OUTREACH NOTE
General Surgery Week 1 Survey    Flowsheet Row Responses   [facility]? [facility]   Does the patient have one of the following disease processes/diagnoses(primary or secondary)? General Surgery   Week 1 attempt successful? No   Unsuccessful attempts Attempt 1          LEONARDA Sloan Registered Nurse

## 2022-03-18 ENCOUNTER — READMISSION MANAGEMENT (OUTPATIENT)
Dept: CALL CENTER | Facility: HOSPITAL | Age: 52
End: 2022-03-18

## 2022-03-18 NOTE — OUTREACH NOTE
General Surgery Week 1 Survey    Flowsheet Row Responses   [facility] from[facility]   Does the patient have one of the following disease processes/diagnoses(primary or secondary)? General Surgery   Week 1 attempt successful? No   Unsuccessful attempts Attempt 2          JUSTINA MARTE - Registered Nurse

## 2022-03-23 ENCOUNTER — READMISSION MANAGEMENT (OUTPATIENT)
Dept: CALL CENTER | Facility: HOSPITAL | Age: 52
End: 2022-03-23

## 2022-03-23 NOTE — OUTREACH NOTE
General Surgery Week 1 Survey    Flowsheet Row Responses   [facility] discharged from? [facility]   Does the patient have one of the following disease processes/diagnoses(primary or secondary)? General Surgery   Week 1 attempt successful? No   Unsuccessful attempts Attempt 3   Rescheduled Revoked  [unable to reach patient after multiple attempts]          RAMY GALAN - Registered Nurse

## 2022-05-13 ENCOUNTER — TRANSCRIBE ORDERS (OUTPATIENT)
Dept: ADMINISTRATIVE | Facility: HOSPITAL | Age: 52
End: 2022-05-13

## 2022-05-13 DIAGNOSIS — Z12.39 BREAST SCREENING: Primary | ICD-10-CM

## 2022-07-19 ENCOUNTER — HOSPITAL ENCOUNTER (OUTPATIENT)
Dept: MAMMOGRAPHY | Facility: HOSPITAL | Age: 52
Discharge: HOME | End: 2022-07-19
Admitting: NURSE PRACTITIONER

## 2022-07-19 DIAGNOSIS — Z12.39 BREAST SCREENING: ICD-10-CM

## 2022-07-19 PROCEDURE — 77067 SCR MAMMO BI INCL CAD: CPT

## 2022-07-19 PROCEDURE — 77063 BREAST TOMOSYNTHESIS BI: CPT

## 2023-07-24 ENCOUNTER — TRANSCRIBE ORDERS (OUTPATIENT)
Dept: ADMINISTRATIVE | Facility: HOSPITAL | Age: 53
End: 2023-07-24
Payer: MEDICARE

## 2023-07-24 DIAGNOSIS — Z12.39 BREAST SCREENING: Primary | ICD-10-CM

## 2023-08-28 NOTE — PHARMACY RECOMMENDATION
"Pharmacy Consult - Enoxaparin Dosing  Abby Antoine is a 51 y.o. female who has been consulted to dose enoxaparin for VTE prophylaxis.     Allergies  Latex    Relevant clinical data and objective history reviewed:   [Ht: 162.6 cm (64\"); Wt: 96.2 kg (212 lb)]  Body mass index is 36.39 kg/m².    Estimated Creatinine Clearance: 79.8 mL/min (by C-G formula based on SCr of 0.96 mg/dL).  Results from last 7 days   Lab Units 03/08/22  0349   HEMOGLOBIN g/dL 14.1   HEMATOCRIT % 41.1   PLATELETS 10*3/mm3 283   CREATININE mg/dL 0.96       Asessment/Plan  Initiate Enoxaparin 40 mg subq q 24 hrs.  Pharmacy will monitor Ms. Antoine's renal function and clinical status and adjust the enoxaparin dose and/or frequency as needed.    Thank you for the opportunity to consult on this patient.    Otoniel Mendez, Pharm.D.  03/08/22  12:50 EST    " Olumiant Counseling: I discussed with the patient the risks of Olumiant therapy including but not limited to upper respiratory tract infections, shingles, cold sores, and nausea. Live vaccines should be avoided.  This medication has been linked to serious infections; higher rate of mortality; malignancy and lymphoproliferative disorders; major adverse cardiovascular events; thrombosis; gastrointestinal perforations; neutropenia; lymphopenia; anemia; liver enzyme elevations; and lipid elevations.

## 2023-09-02 ENCOUNTER — APPOINTMENT (OUTPATIENT)
Dept: CT IMAGING | Facility: HOSPITAL | Age: 53
End: 2023-09-02
Payer: MEDICARE

## 2023-09-02 ENCOUNTER — HOSPITAL ENCOUNTER (EMERGENCY)
Facility: HOSPITAL | Age: 53
Discharge: HOME | End: 2023-09-02
Attending: EMERGENCY MEDICINE
Payer: MEDICARE

## 2023-09-02 ENCOUNTER — APPOINTMENT (OUTPATIENT)
Dept: GENERAL RADIOLOGY | Facility: HOSPITAL | Age: 53
End: 2023-09-02
Payer: MEDICARE

## 2023-09-02 VITALS
OXYGEN SATURATION: 98 % | RESPIRATION RATE: 18 BRPM | HEIGHT: 64 IN | BODY MASS INDEX: 33.12 KG/M2 | HEART RATE: 85 BPM | TEMPERATURE: 98 F | WEIGHT: 194 LBS | DIASTOLIC BLOOD PRESSURE: 77 MMHG | SYSTOLIC BLOOD PRESSURE: 114 MMHG

## 2023-09-02 DIAGNOSIS — R53.83 FATIGUE, UNSPECIFIED TYPE: Primary | ICD-10-CM

## 2023-09-02 DIAGNOSIS — R53.1 WEAKNESS: ICD-10-CM

## 2023-09-02 LAB
ALBUMIN SERPL-MCNC: 3.8 G/DL (ref 3.5–5.2)
ALBUMIN/GLOB SERPL: 1.5 G/DL
ALP SERPL-CCNC: 92 U/L (ref 39–117)
ALT SERPL W P-5'-P-CCNC: 14 U/L (ref 1–33)
ANION GAP SERPL CALCULATED.3IONS-SCNC: 11.6 MMOL/L (ref 5–15)
AST SERPL-CCNC: 21 U/L (ref 1–32)
BACTERIA UR QL AUTO: ABNORMAL /HPF
BASOPHILS # BLD AUTO: 0.07 10*3/MM3 (ref 0–0.2)
BASOPHILS NFR BLD AUTO: 1 % (ref 0–1.5)
BILIRUB SERPL-MCNC: 0.4 MG/DL (ref 0–1.2)
BILIRUB UR QL STRIP: NEGATIVE
BUN SERPL-MCNC: 18 MG/DL (ref 6–20)
BUN/CREAT SERPL: 19.4 (ref 7–25)
CALCIUM SPEC-SCNC: 9 MG/DL (ref 8.6–10.5)
CHLORIDE SERPL-SCNC: 101 MMOL/L (ref 98–107)
CLARITY UR: CLEAR
CO2 SERPL-SCNC: 24.4 MMOL/L (ref 22–29)
COLOR UR: YELLOW
CREAT SERPL-MCNC: 0.93 MG/DL (ref 0.57–1)
CRP SERPL-MCNC: 0.49 MG/DL (ref 0–0.5)
DEPRECATED RDW RBC AUTO: 44.5 FL (ref 37–54)
EGFRCR SERPLBLD CKD-EPI 2021: 74.6 ML/MIN/1.73
EOSINOPHIL # BLD AUTO: 0.56 10*3/MM3 (ref 0–0.4)
EOSINOPHIL NFR BLD AUTO: 8.1 % (ref 0.3–6.2)
ERYTHROCYTE [DISTWIDTH] IN BLOOD BY AUTOMATED COUNT: 14.7 % (ref 12.3–15.4)
ERYTHROCYTE [SEDIMENTATION RATE] IN BLOOD: 10 MM/HR (ref 0–30)
GLOBULIN UR ELPH-MCNC: 2.5 GM/DL
GLUCOSE SERPL-MCNC: 127 MG/DL (ref 65–99)
GLUCOSE UR STRIP-MCNC: NEGATIVE MG/DL
HCT VFR BLD AUTO: 38.2 % (ref 34–46.6)
HGB BLD-MCNC: 12.4 G/DL (ref 12–15.9)
HGB UR QL STRIP.AUTO: NEGATIVE
HOLD SPECIMEN: NORMAL
HOLD SPECIMEN: NORMAL
HYALINE CASTS UR QL AUTO: ABNORMAL /LPF
IMM GRANULOCYTES # BLD AUTO: 0.03 10*3/MM3 (ref 0–0.05)
IMM GRANULOCYTES NFR BLD AUTO: 0.4 % (ref 0–0.5)
KETONES UR QL STRIP: NEGATIVE
LEUKOCYTE ESTERASE UR QL STRIP.AUTO: ABNORMAL
LYMPHOCYTES # BLD AUTO: 2.29 10*3/MM3 (ref 0.7–3.1)
LYMPHOCYTES NFR BLD AUTO: 33.2 % (ref 19.6–45.3)
MAGNESIUM SERPL-MCNC: 1.8 MG/DL (ref 1.6–2.6)
MCH RBC QN AUTO: 26.7 PG (ref 26.6–33)
MCHC RBC AUTO-ENTMCNC: 32.5 G/DL (ref 31.5–35.7)
MCV RBC AUTO: 82.2 FL (ref 79–97)
MONOCYTES # BLD AUTO: 0.4 10*3/MM3 (ref 0.1–0.9)
MONOCYTES NFR BLD AUTO: 5.8 % (ref 5–12)
NEUTROPHILS NFR BLD AUTO: 3.54 10*3/MM3 (ref 1.7–7)
NEUTROPHILS NFR BLD AUTO: 51.5 % (ref 42.7–76)
NITRITE UR QL STRIP: NEGATIVE
NRBC BLD AUTO-RTO: 0 /100 WBC (ref 0–0.2)
PH UR STRIP.AUTO: 6 [PH] (ref 5–8)
PLATELET # BLD AUTO: 248 10*3/MM3 (ref 140–450)
PMV BLD AUTO: 8.7 FL (ref 6–12)
POTASSIUM SERPL-SCNC: 4.1 MMOL/L (ref 3.5–5.2)
PROT SERPL-MCNC: 6.3 G/DL (ref 6–8.5)
PROT UR QL STRIP: NEGATIVE
RBC # BLD AUTO: 4.65 10*6/MM3 (ref 3.77–5.28)
RBC # UR STRIP: ABNORMAL /HPF
REF LAB TEST METHOD: ABNORMAL
SODIUM SERPL-SCNC: 137 MMOL/L (ref 136–145)
SP GR UR STRIP: 1.03 (ref 1–1.03)
SQUAMOUS #/AREA URNS HPF: ABNORMAL /HPF
TROPONIN T SERPL HS-MCNC: <6 NG/L
TSH SERPL DL<=0.05 MIU/L-ACNC: 1.45 UIU/ML (ref 0.27–4.2)
UROBILINOGEN UR QL STRIP: ABNORMAL
WBC # UR STRIP: ABNORMAL /HPF
WBC NRBC COR # BLD: 6.89 10*3/MM3 (ref 3.4–10.8)
WHOLE BLOOD HOLD COAG: NORMAL
WHOLE BLOOD HOLD SPECIMEN: NORMAL

## 2023-09-02 PROCEDURE — 93005 ELECTROCARDIOGRAM TRACING: CPT

## 2023-09-02 PROCEDURE — 96374 THER/PROPH/DIAG INJ IV PUSH: CPT

## 2023-09-02 PROCEDURE — 84443 ASSAY THYROID STIM HORMONE: CPT

## 2023-09-02 PROCEDURE — 70450 CT HEAD/BRAIN W/O DYE: CPT

## 2023-09-02 PROCEDURE — 81001 URINALYSIS AUTO W/SCOPE: CPT | Performed by: EMERGENCY MEDICINE

## 2023-09-02 PROCEDURE — 25010000002 DEXAMETHASONE SODIUM PHOSPHATE 10 MG/ML SOLUTION

## 2023-09-02 PROCEDURE — 25010000002 KETOROLAC TROMETHAMINE PER 15 MG

## 2023-09-02 PROCEDURE — 85652 RBC SED RATE AUTOMATED: CPT

## 2023-09-02 PROCEDURE — 84484 ASSAY OF TROPONIN QUANT: CPT | Performed by: EMERGENCY MEDICINE

## 2023-09-02 PROCEDURE — 99284 EMERGENCY DEPT VISIT MOD MDM: CPT

## 2023-09-02 PROCEDURE — 93005 ELECTROCARDIOGRAM TRACING: CPT | Performed by: EMERGENCY MEDICINE

## 2023-09-02 PROCEDURE — 86140 C-REACTIVE PROTEIN: CPT

## 2023-09-02 PROCEDURE — 25010000002 ORPHENADRINE CITRATE PER 60 MG

## 2023-09-02 PROCEDURE — 80053 COMPREHEN METABOLIC PANEL: CPT | Performed by: EMERGENCY MEDICINE

## 2023-09-02 PROCEDURE — 83735 ASSAY OF MAGNESIUM: CPT | Performed by: EMERGENCY MEDICINE

## 2023-09-02 PROCEDURE — 96375 TX/PRO/DX INJ NEW DRUG ADDON: CPT

## 2023-09-02 PROCEDURE — 71045 X-RAY EXAM CHEST 1 VIEW: CPT

## 2023-09-02 PROCEDURE — 85025 COMPLETE CBC W/AUTO DIFF WBC: CPT | Performed by: EMERGENCY MEDICINE

## 2023-09-02 PROCEDURE — 87468 ANAPLSMA PHGCYTOPHLM AMP PRB: CPT

## 2023-09-02 PROCEDURE — 87484 EHRLICHA CHAFFEENSIS AMP PRB: CPT

## 2023-09-02 PROCEDURE — 72131 CT LUMBAR SPINE W/O DYE: CPT

## 2023-09-02 RX ORDER — SODIUM CHLORIDE 0.9 % (FLUSH) 0.9 %
10 SYRINGE (ML) INJECTION AS NEEDED
Status: DISCONTINUED | OUTPATIENT
Start: 2023-09-02 | End: 2023-09-02 | Stop reason: HOSPADM

## 2023-09-02 RX ORDER — KETOROLAC TROMETHAMINE 30 MG/ML
15 INJECTION, SOLUTION INTRAMUSCULAR; INTRAVENOUS ONCE
Status: COMPLETED | OUTPATIENT
Start: 2023-09-02 | End: 2023-09-02

## 2023-09-02 RX ORDER — METHOCARBAMOL 750 MG/1
750 TABLET, FILM COATED ORAL 3 TIMES DAILY PRN
Qty: 15 TABLET | Refills: 0 | Status: SHIPPED | OUTPATIENT
Start: 2023-09-02

## 2023-09-02 RX ORDER — DEXAMETHASONE SODIUM PHOSPHATE 10 MG/ML
10 INJECTION, SOLUTION INTRAMUSCULAR; INTRAVENOUS ONCE
Status: COMPLETED | OUTPATIENT
Start: 2023-09-02 | End: 2023-09-02

## 2023-09-02 RX ORDER — ORPHENADRINE CITRATE 30 MG/ML
30 INJECTION INTRAMUSCULAR; INTRAVENOUS ONCE
Status: COMPLETED | OUTPATIENT
Start: 2023-09-02 | End: 2023-09-02

## 2023-09-02 RX ADMIN — KETOROLAC TROMETHAMINE 15 MG: 30 INJECTION, SOLUTION INTRAMUSCULAR; INTRAVENOUS at 16:09

## 2023-09-02 RX ADMIN — ORPHENADRINE CITRATE 30 MG: 60 INJECTION INTRAMUSCULAR; INTRAVENOUS at 16:11

## 2023-09-02 RX ADMIN — SODIUM CHLORIDE 1000 ML: 9 INJECTION, SOLUTION INTRAVENOUS at 16:37

## 2023-09-02 RX ADMIN — DEXAMETHASONE SODIUM PHOSPHATE 10 MG: 10 INJECTION INTRAMUSCULAR; INTRAVENOUS at 17:52

## 2023-09-02 NOTE — DISCHARGE INSTRUCTIONS
Your work-up thus far does not show any acute abnormality.  The labs regarding the autoimmune diseases and tickborne illnesses are still pending.  You will be notified if there are any positives.  What you are experiencing could be related to your underlying rheumatoid arthritis and or fibromyalgia, but would recommend a follow-up with your primary provider for a reevaluation.  Can return to the ER for any new or worsening symptoms.

## 2023-09-02 NOTE — ED PROVIDER NOTES
Subjective:  History of Present Illness:    Patient is a 53-year-old female here today with generalized weakness.  She is reporting progressive symptoms over the past months regarding her overall health.  She is feeling more fatigued and tired.  She is having episodes of weakness where is causing her to lose her balance and fall.  Intermittent full body pain, numbness, and tingling.  She reports having a history of rheumatoid arthritis and fibromyalgia, but due to insurance issues she has not been able to take any of her regular medications for some time.  She is also concerned about potential Lyme disease as her dog is currently being treated for this and she recently removed a tick from herself.  She has noted joint pain and swelling, as well as some fevers.  Patient has been experiencing low back pain and this has not improved after completing antibiotics for UTI.  She reports having 1 kidney, but thinks that her back pain may be due to her recent falls.  Overall the patient does not feel well.      Nurses Notes reviewed and agree, including vitals, allergies, social history and prior medical history.     REVIEW OF SYSTEMS: All systems reviewed and not pertinent unless noted.  Review of Systems    Past Medical History:   Diagnosis Date    Abnormal Pap smear of cervix     Acid reflux     Anemia     Anxiety     Back pain     Bipolar disorder     CHF (congestive heart failure) 2017    Deafness     Depression     Fibromyalgia     Folic acid deficiency     Gout     Headache     History of blood transfusion     at birth    HTN (hypertension)     Hyperlipidemia     Insomnia     Iron deficiency     Kidney stone     Liver disease     Ovarian cyst     Panic attack     PMS (premenstrual syndrome)     RA (rheumatoid arthritis)     RLS (restless legs syndrome)     Seasonal allergies     Sinus problem     Sleep apnea     Tattoos     Trauma     Urinary tract infection     Vision problems     Vitamin B12 deficiency   "      Allergies:    Latex      Past Surgical History:   Procedure Laterality Date    CERVIX LESION DESTRUCTION      COLONOSCOPY  8- years or so    [facility]    TUBAL ABDOMINAL LIGATION  1994    URETEROSCOPY LASER LITHOTRIPSY WITH STENT INSERTION Left 3/9/2022    Procedure: URETEROSCOPY LASER LITHOTRIPSY WITH STENT INSERTION;  Surgeon: Troy Torres MD;  Location: [facility] OR;  Service: Urology;  Laterality: Left;         Social History     Socioeconomic History    Marital status:    Tobacco Use    Smoking status: Former     Packs/day: 0.25     Types: Cigarettes     Start date: 1997    Smokeless tobacco: Never   Vaping Use    Vaping Use: Never used   Substance and Sexual Activity    Alcohol use: Yes     Comment: social    Drug use: Never    Sexual activity: Defer         Family History   Problem Relation Age of Onset    Coronary artery disease Father     Hypertension Father     Melanoma Father     Stroke Maternal Grandmother     Breast cancer Maternal Aunt     Cervical cancer Maternal Aunt     Liver cancer Paternal Aunt     Inflammatory bowel disease Paternal Aunt     Liver cancer Paternal Uncle     Liver cancer Paternal Grandfather     Cirrhosis Maternal Uncle         alcohol induced    Colon cancer Neg Hx        Objective  Physical Exam:  /77 (Patient Position: Standing)   Pulse 85   Temp 98 °F (36.7 °C) (Oral)   Resp 18   Ht 162.6 cm (64\")   Wt 88 kg (194 lb)   SpO2 98%   BMI 33.30 kg/m²      Physical Exam  Vitals and nursing note reviewed.   Constitutional:       Appearance: Normal appearance.   HENT:      Head: Normocephalic and atraumatic.      Right Ear: Tympanic membrane, ear canal and external ear normal.      Left Ear: Tympanic membrane, ear canal and external ear normal.      Nose: Nose normal.      Mouth/Throat:      Mouth: Mucous membranes are moist.      Pharynx: Oropharynx is clear.   Eyes:      Extraocular Movements: Extraocular movements intact.      " Conjunctiva/sclera: Conjunctivae normal.      Pupils: Pupils are equal, round, and reactive to light.   Neck:      Vascular: No carotid bruit.   Cardiovascular:      Rate and Rhythm: Normal rate and regular rhythm.      Pulses: Normal pulses.      Heart sounds: Normal heart sounds.   Pulmonary:      Effort: Pulmonary effort is normal.      Breath sounds: Normal breath sounds.   Abdominal:      General: Abdomen is flat. Bowel sounds are normal. There is no distension.      Palpations: Abdomen is soft.      Tenderness: There is no abdominal tenderness.   Musculoskeletal:      Cervical back: Neck supple. No tenderness.      Right lower leg: No edema.      Left lower leg: No edema.   Lymphadenopathy:      Cervical: No cervical adenopathy.   Skin:     General: Skin is warm and dry.      Capillary Refill: Capillary refill takes less than 2 seconds.   Neurological:      General: No focal deficit present.      Mental Status: She is alert and oriented to person, place, and time.   Psychiatric:         Mood and Affect: Mood normal.         Behavior: Behavior normal.       Procedures    ED Course:    ED Course as of 09/02/23 1957   Sat Sep 02, 2023   1408 EKG interpreted by me.  Normal sinus rhythm.  Rate of 77.  Intervals appropriate.  No obvious ST segment or T wave changes.  Normal EKG [CG]      ED Course User Index  [CG] Armond Garcia,        Lab Results (last 24 hours)       Procedure Component Value Units Date/Time    CBC & Differential [[id number]]  (Abnormal) Collected: 09/02/23 1410    Specimen: Blood Updated: 09/02/23 1416    Narrative:      The following orders were created for panel order CBC & Differential.  Procedure                               Abnormality         Status                     ---------                               -----------         ------                     CBC Auto Differential[[id number]]        Abnormal            Final result                 Please view results for these tests on the  individual orders.    Comprehensive Metabolic Panel [[id number]]  (Abnormal) Collected: 09/02/23 1410    Specimen: Blood Updated: 09/02/23 1438     Glucose 127 mg/dL      BUN 18 mg/dL      Creatinine 0.93 mg/dL      Sodium 137 mmol/L      Potassium 4.1 mmol/L      Chloride 101 mmol/L      CO2 24.4 mmol/L      Calcium 9.0 mg/dL      Total Protein 6.3 g/dL      Albumin 3.8 g/dL      ALT (SGPT) 14 U/L      AST (SGOT) 21 U/L      Alkaline Phosphatase 92 U/L      Total Bilirubin 0.4 mg/dL      Globulin 2.5 gm/dL      A/G Ratio 1.5 g/dL      BUN/Creatinine Ratio 19.4     Anion Gap 11.6 mmol/L      eGFR 74.6 mL/min/1.73     Narrative:      GFR Normal >60  Chronic Kidney Disease <60  Kidney Failure <15      Single High Sensitivity Troponin T [[id number]]  (Normal) Collected: 09/02/23 1410    Specimen: Blood Updated: 09/02/23 1440     HS Troponin T <6 ng/L     Narrative:      High Sensitive Troponin T Reference Range:  <10.0 ng/L- Negative Female for AMI  <15.0 ng/L- Negative Male for AMI  >=10 - Abnormal Female indicating possible myocardial injury.  >=15 - Abnormal Male indicating possible myocardial injury.   Clinicians would have to utilize clinical acumen, EKG, Troponin, and serial changes to determine if it is an Acute Myocardial Infarction or myocardial injury due to an underlying chronic condition.         Magnesium [[id number]]  (Normal) Collected: 09/02/23 1410    Specimen: Blood Updated: 09/02/23 1438     Magnesium 1.8 mg/dL     CBC Auto Differential [[id number]]  (Abnormal) Collected: 09/02/23 1410    Specimen: Blood Updated: 09/02/23 1416     WBC 6.89 10*3/mm3      RBC 4.65 10*6/mm3      Hemoglobin 12.4 g/dL      Hematocrit 38.2 %      MCV 82.2 fL      MCH 26.7 pg      MCHC 32.5 g/dL      RDW 14.7 %      RDW-SD 44.5 fl      MPV 8.7 fL      Platelets 248 10*3/mm3      Neutrophil % 51.5 %      Lymphocyte % 33.2 %      Monocyte % 5.8 %      Eosinophil % 8.1 %      Basophil % 1.0 %      Immature Grans % 0.4 %       Neutrophils, Absolute 3.54 10*3/mm3      Lymphocytes, Absolute 2.29 10*3/mm3      Monocytes, Absolute 0.40 10*3/mm3      Eosinophils, Absolute 0.56 10*3/mm3      Basophils, Absolute 0.07 10*3/mm3      Immature Grans, Absolute 0.03 10*3/mm3      nRBC 0.0 /100 WBC     Sedimentation Rate [[id number]]  (Normal) Collected: 09/02/23 1410    Specimen: Blood Updated: 09/02/23 1612     Sed Rate 10 mm/hr     C-reactive Protein [[id number]]  (Normal) Collected: 09/02/23 1410    Specimen: Blood Updated: 09/02/23 1550     C-Reactive Protein 0.49 mg/dL     Ehrlichia Profile DNA PCR [[id number]] Collected: 09/02/23 1410    Specimen: Blood Updated: 09/02/23 1540    Rickettsia Species DNA, Real-Time PCR [[id number]] Collected: 09/02/23 1410    Specimen: Blood Updated: 09/02/23 1544    Lyme Disease Total Antibody With Reflex to Immunoassay [[id number]] Collected: 09/02/23 1410    Specimen: Blood Updated: 09/02/23 1542    MIGUE Direct Reflex to 11 Biomarker [[id number]] Collected: 09/02/23 1410    Specimen: Blood Updated: 09/02/23 1544    TSH Rfx On Abnormal To Free T4 [[id number]]  (Normal) Collected: 09/02/23 1410    Specimen: Blood Updated: 09/02/23 1600     TSH 1.450 uIU/mL     Urinalysis With Microscopic If Indicated (No Culture) - Urine, Clean Catch [[id number]]  (Abnormal) Collected: 09/02/23 1502    Specimen: Urine, Clean Catch Updated: 09/02/23 1514     Color, UA Yellow     Appearance, UA Clear     pH, UA 6.0     Specific Gravity, UA 1.027     Glucose, UA Negative     Ketones, UA Negative     Bilirubin, UA Negative     Blood, UA Negative     Protein, UA Negative     Leuk Esterase, UA Trace     Nitrite, UA Negative     Urobilinogen, UA 0.2 E.U./dL    Urinalysis, Microscopic Only - Urine, Clean Catch [[id number]]  (Abnormal) Collected: 09/02/23 1502    Specimen: Urine, Clean Catch Updated: 09/02/23 1529     RBC, UA None Seen /HPF      WBC, UA 3-5 /HPF      Bacteria, UA None Seen /HPF      Squamous Epithelial Cells, UA 3-6 /HPF       Hyaline Casts, UA None Seen /LPF      Methodology Manual Light Microscopy             CT Head Without Contrast    Result Date: 9/2/2023  FINAL REPORT CLINICAL HISTORY: Falls, numbness, tingling FINDINGS: Exam: CT head without IV contrast.  Indication: Trauma Technique: CT head without IV contrast was performed. Multiplanar reconstructions performed.  Comparison: None Findings: No acute large territory infarct.  No intracranial hemorrhage.  No mass effect.  Normal ventricles.  No mass effect.  No midline shift.  No extra-axial collection. Paranasal sinuses are clear.  Orbital structures are unremarkable.     Impression: No acute intracranial abnormality. Authenticated and Electronically Signed by Deanna Murphy MD on 09/02/2023 05:06:43 PM    CT Lumbar Spine Without Contrast    Result Date: 9/2/2023  FINAL REPORT TECHNIQUE: Axial CT images were obtained through the lumbar spine. Sagittal and coronal reformatted images were generated from the axial data set and provided for interpretation. This study was performed with techniques to keep radiation doses as low as reasonably achievable (ALARA). Individualized dose reduction techniques using automated exposure control or adjustment of mA and/or kV according to the patient's size were employed. CLINICAL HISTORY: Falls, low back pain FINDINGS: No acute fracture or malalignment of the lumbar spine.  The lumbar lordosis is preserved.  The vertebral body heights are maintained.  The facets are appropriately aligned. Mild multilevel degenerative changes are present.  No acute paraspinal abnormalities.     Impression: No acute fracture or malalignment of the lumbar spine. Authenticated and Electronically Signed by Joaquim Paula MD on 09/02/2023 05:30:17 PM    XR Chest 1 View    Result Date: 9/2/2023  PORTABLE CHEST  9/2/2023 4:02 PM  HISTORY: Chest pain  COMPARISON:   None  FINDINGS: The cardiac silhouette is normal in size. The mediastinal and hilar contours are  unremarkable.  The lungs are clear. There is no pneumothorax. The visualized osseous structures demonstrate no acute abnormalities.      Impression: No acute cardiopulmonary process.        This report was signed and finalized on 9/2/2023 3:08 PM by Deanna Murphy MD.          MDM     Amount and/or Complexity of Data Reviewed  Clinical lab tests: reviewed  Tests in the radiology section of CPT®: reviewed  Tests in the medicine section of CPT®: reviewed        Initial impression of presenting illness: Patient is a 53-year-old female here today with fatigue and weakness.  She does not appear to be in acute distress.    DDX: includes but is not limited to: UTI, pneumonia, autoimmune disorder, fibromyalgia flare, borne illness, among others    Patient arrives hemodynamically stable with vitals interpreted by myself.     Pertinent features from physical exam: Overall benign exam, no acute abnormality noted.    Initial diagnostic plan: CBC, CMP, magnesium, urinalysis, TSH, troponin, CRP, sed rate and chest x-ray.  Per patient request CT lumbar spine, CT head, and tickborne illness labs.  Also added MIGUE with reflex.    Results from initial plan were reviewed and interpreted by me revealing all labs within their normal ranges.  No obvious abnormalities noted on radiology exams per report.    Diagnostic information from other sources: Medical record    Interventions / Re-evaluation: Was treated with an IV bolus of saline along with Toradol and Norflex.  She did not note much improvement with those medications.  She did requested a steroid injection, provided her with a dose of dexamethasone via IV.    Results/clinical rationale were discussed with patient and her relatively negative work-up.  Advised her that the remaining labs will return in the next few days and she will be notified if there are any positives.  Encouraged her to use the prescription medications provided for her body aches to see if this improves her  symptoms.  Also recommended to follow-up with her primary provider for reevaluation.  She can return to the ER for any new or worsening symptoms.    Consultations/Discussion of results with other physicians: None    Disposition plan: Patient discharged home in stable condition.  -----        Final diagnoses:   Fatigue, unspecified type   Weakness          Caleb Reddy, APRN  09/02/23 1957

## 2023-09-03 NOTE — CASE MANAGEMENT/SOCIAL WORK
Case Management/Social Work    Patient Name:  Abby Antoine  YOB: 1970  MRN: [id number]  Admit Date:  9/2/2023      SW received a call from Irma at 17:46 stating that Pt. is not able to afford her medications. SW explained that she would make Pt. a PCP appointment on Tuesday with [facility] once they open because they have their own pharmacy and can help assist with medications. JOANN also stated that she would call Pt. on Tuesday after making an appointment to let her know when it is. SW will continue to assist.       9/5/2023: JOANN called [facility] and spoke with Jeanine to get Pt. An appointment. Pt. See's Maria Watkins at [facility]. Jeanine suggested that Pt. Try to come in for a same day appointment to get medication refills if not, Pt. Has been scheduled to see Maria Watkins on 9/18 at 1:40 PM. JOANN called and informed Pt. Of the above information and she stated that she plans to call to try to get a same day appointment.     Electronically signed by:  Roge Luna  09/03/23 12:06 EDT

## 2023-09-04 PROCEDURE — 87484 EHRLICHA CHAFFEENSIS AMP PRB: CPT

## 2023-09-04 PROCEDURE — 87798 DETECT AGENT NOS DNA AMP: CPT

## 2023-09-04 PROCEDURE — 86618 LYME DISEASE ANTIBODY: CPT

## 2023-09-04 PROCEDURE — 86038 ANTINUCLEAR ANTIBODIES: CPT

## 2023-09-04 PROCEDURE — 87468 ANAPLSMA PHGCYTOPHLM AMP PRB: CPT

## 2023-09-06 LAB
ANA SER QL: NEGATIVE
B BURGDOR IGG+IGM SER QL IA: NEGATIVE

## 2023-09-08 LAB
A PHAGOCYTOPH DNA BLD QL NAA+PROBE: NEGATIVE
E CHAFFEENSIS DNA BLD QL NAA+PROBE: NEGATIVE

## 2023-09-09 LAB — RICKETTSIA RICKETTSII DNA, RT: NOT DETECTED

## 2023-12-07 ENCOUNTER — TRANSCRIBE ORDERS (OUTPATIENT)
Dept: ADMINISTRATIVE | Facility: HOSPITAL | Age: 53
End: 2023-12-07
Payer: MEDICARE

## 2023-12-07 DIAGNOSIS — Z12.31 SCREENING MAMMOGRAM, ENCOUNTER FOR: Primary | ICD-10-CM

## 2024-04-22 DIAGNOSIS — M25.512 ARTHRALGIA OF LEFT SHOULDER REGION: Primary | ICD-10-CM

## 2024-04-23 ENCOUNTER — OFFICE VISIT (OUTPATIENT)
Dept: ORTHOPEDIC SURGERY | Facility: CLINIC | Age: 54
End: 2024-04-23
Payer: MEDICARE

## 2024-04-23 VITALS — BODY MASS INDEX: 36.4 KG/M2 | TEMPERATURE: 98.5 F | WEIGHT: 213.2 LBS | HEIGHT: 64 IN

## 2024-04-23 DIAGNOSIS — M25.512 ARTHRALGIA OF LEFT SHOULDER REGION: Primary | ICD-10-CM

## 2024-04-23 DIAGNOSIS — M75.41 IMPINGEMENT SYNDROME OF RIGHT SHOULDER: ICD-10-CM

## 2024-04-23 DIAGNOSIS — M75.42 IMPINGEMENT SYNDROME OF LEFT SHOULDER: ICD-10-CM

## 2024-04-23 PROCEDURE — 99203 OFFICE O/P NEW LOW 30 MIN: CPT | Performed by: PHYSICIAN ASSISTANT

## 2024-04-23 PROCEDURE — 20610 DRAIN/INJ JOINT/BURSA W/O US: CPT | Performed by: PHYSICIAN ASSISTANT

## 2024-04-23 RX ORDER — HYDROXYZINE HYDROCHLORIDE 25 MG/1
1 TABLET, FILM COATED ORAL 3 TIMES DAILY
COMMUNITY
Start: 2024-03-15

## 2024-04-23 RX ORDER — SERTRALINE HYDROCHLORIDE 100 MG/1
TABLET, FILM COATED ORAL
COMMUNITY
Start: 2024-04-15

## 2024-04-23 RX ORDER — TRAZODONE HYDROCHLORIDE 100 MG/1
TABLET ORAL
COMMUNITY
Start: 2024-04-15

## 2024-04-23 RX ADMIN — METHYLPREDNISOLONE ACETATE 40 MG: 40 INJECTION, SUSPENSION INTRA-ARTICULAR; INTRALESIONAL; INTRAMUSCULAR; SOFT TISSUE at 15:33

## 2024-04-23 RX ADMIN — LIDOCAINE HYDROCHLORIDE 2 ML: 20 INJECTION, SOLUTION INFILTRATION; PERINEURAL at 15:33

## 2024-04-23 NOTE — PROGRESS NOTES
Subjective   Patient ID: Abby Antoine is a 53 y.o. right hand dominant female is being seen for orthopaedic evaluation today for bilateral shoulders   Pain of the Left Shoulder (Reports pain for 6 years, no known injury. Having issues with range of motion. ) and Pain of the Right Shoulder         Pain  Associated symptoms include arthralgias (bilateral shoulder).     Patient presents with approx 6 years of bilateral shoulder pain. Typically, both shoulders are equally painful.  No injury or trauma. The pain   She has tried cortisone injection and HEP for her shoulder pain in past a well as prescription NSAID.  She does endorse some midline cervical pain.                                                   Past Medical History:   Diagnosis Date    Abnormal Pap smear of cervix     Acid reflux     Anemia     Anxiety     Back pain     Bipolar disorder     CHF (congestive heart failure) 2017    Deafness     Depression     Fibromyalgia     Folic acid deficiency     Gout     Headache     History of blood transfusion     at birth    HTN (hypertension)     Hyperlipidemia     Insomnia     Iron deficiency     Kidney stone     Liver disease     Ovarian cyst     Panic attack     PMS (premenstrual syndrome)     RA (rheumatoid arthritis)     RLS (restless legs syndrome)     Seasonal allergies     Sinus problem     Sleep apnea     Tattoos     Trauma     Urinary tract infection     Vision problems     Vitamin B12 deficiency         Past Surgical History:   Procedure Laterality Date    CERVIX LESION DESTRUCTION      COLONOSCOPY  8- years or so    [facility]    TUBAL ABDOMINAL LIGATION  1994    URETEROSCOPY LASER LITHOTRIPSY WITH STENT INSERTION Left 3/9/2022    Procedure: URETEROSCOPY LASER LITHOTRIPSY WITH STENT INSERTION;  Surgeon: Troy Torres MD;  Location: [facility] OR;  Service: Urology;  Laterality: Left;       Family History   Problem Relation Age of Onset    Coronary artery disease Father     Hypertension  "Father     Melanoma Father     Stroke Maternal Grandmother     Breast cancer Maternal Aunt     Cervical cancer Maternal Aunt     Liver cancer Paternal Aunt     Inflammatory bowel disease Paternal Aunt     Liver cancer Paternal Uncle     Liver cancer Paternal Grandfather     Cirrhosis Maternal Uncle         alcohol induced    Colon cancer Neg Hx         Social History     Socioeconomic History    Marital status:    Tobacco Use    Smoking status: Former     Current packs/day: 0.25     Average packs/day: 0.3 packs/day for 27.3 years (6.8 ttl pk-yrs)     Types: Cigarettes     Start date: 1997    Smokeless tobacco: Never   Vaping Use    Vaping status: Never Used   Substance and Sexual Activity    Alcohol use: Yes     Comment: social    Drug use: Never    Sexual activity: Defer       Allergies   Allergen Reactions    Latex Rash       Review of Systems   Musculoskeletal:  Positive for arthralgias (bilateral shoulder).       Objective   Temp 98.5 °F (36.9 °C)   Ht 162.6 cm (64\")   Wt 96.7 kg (213 lb 3.2 oz)   BMI 36.60 kg/m²   Physical Exam  Vitals and nursing note reviewed.   Constitutional:       Appearance: Normal appearance.   Pulmonary:      Effort: Pulmonary effort is normal.   Musculoskeletal:      Right shoulder: No deformity. Normal range of motion. Normal strength.      Left shoulder: No deformity. Decreased range of motion (limited). Normal strength.   Neurological:      Mental Status: She is alert and oriented to person, place, and time.       Right Shoulder Exam     Tests   Cross arm: positive  Impingement: positive  Drop arm: negative    Other   Erythema: absent  Sensation: normal  Pulse: present      Left Shoulder Exam     Tests   Cross arm: positive  Impingement: positive  Drop arm: negative    Other   Erythema: absent  Sensation: normal  Pulse: present             Neurologic Exam     Mental Status   Oriented to person, place, and time.        Neg. Spurling's test      Assessment & Plan "   Independent Review of Radiographic Studies:      Xray of bilateral shoulder 3 views each performed in the clinic independently reviewed for evaluation pain.  No comparison films available for review.  No acute fracture or dislocation.        Large Joint Arthrocentesis: R subacromial bursa  Date/Time: 4/23/2024 3:33 PM  Consent given by: patient  Site marked: site marked  Timeout: Immediately prior to procedure a time out was called to verify the correct patient, procedure, equipment, support staff and site/side marked as required   Supporting Documentation  Indications: pain   Procedure Details  Location: shoulder - R subacromial bursa  Preparation: Patient was prepped and draped in the usual sterile fashion  Needle size: 22 G  Approach: posterior  Medications administered: 40 mg methylPREDNISolone acetate 40 MG/ML; 2 mL lidocaine 2%  Patient tolerance: patient tolerated the procedure well with no immediate complications      Large Joint Arthrocentesis: L subacromial bursa  Date/Time: 4/23/2024 3:33 PM  Consent given by: patient  Site marked: site marked  Timeout: Immediately prior to procedure a time out was called to verify the correct patient, procedure, equipment, support staff and site/side marked as required   Supporting Documentation  Indications: pain   Procedure Details  Location: shoulder - L subacromial bursa  Preparation: Patient was prepped and draped in the usual sterile fashion  Needle size: 22 G  Approach: posterior  Medications administered: 40 mg methylPREDNISolone acetate 40 MG/ML; 2 mL lidocaine 2%  Patient tolerance: patient tolerated the procedure well with no immediate complications      [x] No procedures were performed in office today.    Diagnoses and all orders for this visit:    1. Arthralgia of left shoulder region (Primary)  -     MRI shoulder left wo contrast; Future    2. Impingement syndrome of left shoulder  -     MRI shoulder left wo contrast; Future    3. Impingement syndrome of  right shoulder    Other orders  -     Large Joint Arthrocentesis: R subacromial bursa  -     Large Joint Arthrocentesis: L subacromial bursa       Discussion of orthopedic goals  Risk, benefits, and merits of treatment alternatives reviewed with the patient and questions answered  Ice, heat, and/or modalities as beneficial    Recommendations/Plan:  Exercise, medications, injections, other patient advice, and return appointment as noted.  Patient is encouraged to call or return for any issues or concerns.    Follow up after MRI left shoulder    We will start with Left shoulder MRi because she mentions left shoulder is more painful.    Patient agreeable to call or return sooner for any concerns.    Class 2 Severe Obesity (BMI >=35 and <=39.9). Obesity-related health conditions include the following: hypertension and osteoarthritis. Obesity is newly identified. BMI is is above average; BMI management plan is completed. We discussed low calorie, low carb based diet program, portion control, and increasing exercise.      Clinical References    BMI for Adults  Body mass index (BMI) is a number found using a person's weight and height. BMI can help tell how much of a person's weight is made up of fat. BMI does not measure body fat directly. It is used instead of tests that directly measure body fat, which can be difficult and expensive.  What are BMI measurements used for?  BMI is useful to:  Find out if your weight puts you at higher risk for medical problems.  Help recommend changes, such as in diet and exercise. This can help you reach a healthy weight. BMI screening can be done again to see if these changes are working.  How is BMI calculated?  Your height and weight are measured. The BMI is found from those numbers. This can be done with U[place] or metric measurements. Note that charts and online BMI calculators are available to help you find your BMI quickly and easily without doing these calculations.  To calculate  "your BMI in U.S[place] measurements:  Measure your weight in pounds (lb).  Multiply the number of pounds by 703.  So, for an adult who weighs 150 lb, multiply that number by 703: 150 x 703, which equals 105,450.  Measure your height in inches. Then multiply that number by itself to get a measurement called \"inches squared.\"  So, for an adult who is 70 inches tall, the \"inches squared\" measurement is 70 inches x 70 inches, which equals 4,900 inches squared.  Divide the total from step 2 (number of lb x 703) by the total from step 3 (inches squared): 105,450 ÷ 4,900 = 21.5. This is your BMI.  To calculate your BMI in metric measurements:  Measure your weight in kilograms (kg).  For this example, the weight is 70 kg.  Measure your height in meters (m). Then multiply that number by itself to get a measurement called \"meters squared.\"  So, for an adult who is 1.75 m tall, the \"meters squared\" measurement is 1.75 m x 1.75 m, which equals 3.1 meters squared.  Divide the number of kilograms (your weight) by the meters squared number. In this example: 70 ÷ 3.1 = 22.6. This is your BMI.  What do the results mean?  BMI charts are used to see if you are underweight, normal weight, overweight, or obese. The following guidelines will be used:  Underweight: BMI less than 18.5.  Normal weight: BMI between 18.5 and 24.9.  Overweight: BMI between 25 and 29.9.  Obese: BMI of 30 or above.  BMI is a tool and cannot diagnose a condition. Talk with your health care provider about what your BMI means for you. Keep these notes in mind:  Weight includes fat and muscle. Someone with a muscular build, such as an athlete, may have a BMI that is higher than 24.9. In cases like these, BMI is not a correct measure of body fat.  If you have a BMI of 25 or higher, your provider may need to do more testing to find out if excess body fat is the cause.  BMI is measured the same way for males and females. Females usually have more body fat than males of the " same height and weight.  Where to find more information  For more information about BMI, including tools to quickly find your BMI, go to:  Centers for Disease Control and Prevention: cdc.gov  American Heart Association: heart.org  National Heart, Lung, and Blood [place]: nhlbi.nih.gov  This information is not intended to replace advice given to you by your health care provider. Make sure you discuss any questions you have with your health care provider.  Document Revised: 09/07/2023 Document Reviewed: 08/31/2023  Elsevier Patient Education © 2024 [employer]     Obesity, Adult  Obesity is having too much body fat. Being obese means that your weight is more than what is healthy for you.   BMI (body mass index) is a number that explains how much body fat you have. If you have a BMI of 30 or more, you are obese.  Obesity can cause serious health problems, such as:  Stroke.  Coronary artery disease (CAD).  Type 2 diabetes.  Some types of cancer.  High blood pressure (hypertension).  High cholesterol.  Gallbladder stones.  Obesity can also contribute to:  Osteoarthritis.  Sleep apnea.  Infertility problems.  What are the causes?  Eating meals each day that are high in calories, sugar, and fat.  Drinking a lot of drinks that have sugar in them.  Being born with genes that may make you more likely to become obese.  Having a medical condition that causes obesity.  Taking certain medicines.  Sitting a lot (having a sedentary lifestyle).  Not getting enough sleep.  What increases the risk?  Having a family history of obesity.  Living in an area with limited access to:  [place], recreation centers, or sidewalks.  Healthy food choices, such as grocery stores and farmers' markets.  What are the signs or symptoms?  The main sign is having too much body fat.  How is this treated?  Treatment for this condition often includes changing your lifestyle. Treatment may include:  Changing your diet. This may include making a healthy  meal plan.  Exercise. This may include activity that causes your heart to beat faster (aerobic exercise) and strength training. Work with your doctor to design a program that works for you.  Medicine to help you lose weight. This may be used if you are not able to lose one pound a week after 6 weeks of healthy eating and more exercise.  Treating conditions that cause the obesity.  Surgery. Options may include gastric banding and gastric bypass. This may be done if:  Other treatments have not helped to improve your condition.  You have a BMI of 40 or higher.  You have life-threatening health problems related to obesity.  Follow these instructions at home:  Eating and drinking  Follow advice from your doctor about what to eat and drink. Your doctor may tell you to:  Limit fast food, sweets, and processed snack foods.  Choose low-fat options. For example, choose low-fat milk instead of whole milk.  Eat five or more servings of fruits or vegetables each day.  Eat at home more often. This gives you more control over what you eat.  Choose healthy foods when you eat out.  Learn to read food labels. This will help you learn how much food is in one serving.  Keep low-fat snacks available.  Avoid drinks that have a lot of sugar in them. These include soda, fruit juice, iced tea with sugar, and flavored milk.  Drink enough water to keep your pee (urine) pale yellow.  Do not go on fad diets.  Physical activity  Exercise often, as told by your doctor. Most adults should get up to 150 minutes of moderate-intensity exercise every week.Ask your doctor:  What types of exercise are safe for you.  How often you should exercise.  Warm up and stretch before being active.  Do slow stretching after being active (cool down).  Rest between times of being active.  Lifestyle  Work with your doctor and a food expert (dietitian) to set a weight-loss goal that is best for you.  Limit your screen time.  Find ways to reward yourself that do not  involve food.  Do not drink alcohol if:  Your doctor tells you not to drink.  You are pregnant, may be pregnant, or are planning to become pregnant.  If you drink alcohol:  Limit how much you have to:  0-1 drink a day for women.  0-2 drinks a day for men.  Know how much alcohol is in your drink. In the [place] one drink equals one 12 oz bottle of beer (355 mL), one 5 oz glass of wine (148 mL), or one 1½ oz glass of hard liquor (44 mL).  General instructions  Keep a weight-loss journal. This can help you keep track of:  The food that you eat.  How much exercise you get.  Take over-the-counter and prescription medicines only as told by your doctor.  Take vitamins and supplements only as told by your doctor.  Think about joining a support group.  Pay attention to your mental health as obesity can lead to depression or self esteem issues.  Keep all follow-up visits.  Contact a doctor if:  You cannot meet your weight-loss goal after you have changed your diet and lifestyle for 6 weeks.  You are having trouble breathing.  Summary  Obesity is having too much body fat.  Being obese means that your weight is more than what is healthy for you.  Work with your doctor to set a weight-loss goal.  Get regular exercise as told by your doctor.  This information is not intended to replace advice given to you by your health care provider. Make sure you discuss any questions you have with your health care provider.  Document Revised: 07/26/2022 Document Reviewed: 07/26/2022  [employer] Patient Education © 2024 Elsevier Inc.

## 2024-04-24 RX ORDER — METHYLPREDNISOLONE ACETATE 40 MG/ML
40 INJECTION, SUSPENSION INTRA-ARTICULAR; INTRALESIONAL; INTRAMUSCULAR; SOFT TISSUE
Status: COMPLETED | OUTPATIENT
Start: 2024-04-23 | End: 2024-04-23

## 2024-04-24 RX ORDER — LIDOCAINE HYDROCHLORIDE 20 MG/ML
2 INJECTION, SOLUTION INFILTRATION; PERINEURAL
Status: COMPLETED | OUTPATIENT
Start: 2024-04-23 | End: 2024-04-23

## 2024-04-29 ENCOUNTER — PATIENT ROUNDING (BHMG ONLY) (OUTPATIENT)
Dept: ORTHOPEDIC SURGERY | Facility: CLINIC | Age: 54
End: 2024-04-29
Payer: MEDICARE

## 2024-04-29 NOTE — PROGRESS NOTES
April 29, 2024    Hello, may I speak with Abby Antoine?    My name is Tangela      I am  with [facility]  [address] [place] [id number] [phone].    Before we get started may I verify your date of birth? 1970    I am calling to officially welcome you to our practice and ask about your recent visit. Is this a good time to talk? No - no answer    Tell me about your visit with us. What things went well?         We're always looking for ways to make our patients' experiences even better. Do you have recommendations on ways we may improve?      Overall were you satisfied with your first visit to our practice?        I appreciate you taking the time to speak with me today. Is there anything else I can do for you?       Thank you, and have a great day.

## 2024-06-06 ENCOUNTER — HOSPITAL ENCOUNTER (OUTPATIENT)
Dept: MRI IMAGING | Facility: HOSPITAL | Age: 54
Discharge: HOME | End: 2024-06-06
Admitting: PHYSICIAN ASSISTANT
Payer: MEDICARE

## 2024-06-06 DIAGNOSIS — M25.512 ARTHRALGIA OF LEFT SHOULDER REGION: ICD-10-CM

## 2024-06-06 DIAGNOSIS — M75.42 IMPINGEMENT SYNDROME OF LEFT SHOULDER: ICD-10-CM

## 2024-06-06 PROCEDURE — 73221 MRI JOINT UPR EXTREM W/O DYE: CPT

## 2024-06-11 ENCOUNTER — OFFICE VISIT (OUTPATIENT)
Dept: ORTHOPEDIC SURGERY | Facility: CLINIC | Age: 54
End: 2024-06-11
Payer: MEDICARE

## 2024-06-11 VITALS — BODY MASS INDEX: 33.84 KG/M2 | WEIGHT: 198.2 LBS | TEMPERATURE: 98.1 F | HEIGHT: 64 IN

## 2024-06-11 DIAGNOSIS — R20.2 PARESTHESIA OF ARM: Primary | ICD-10-CM

## 2024-06-11 PROCEDURE — 72040 X-RAY EXAM NECK SPINE 2-3 VW: CPT | Performed by: PHYSICIAN ASSISTANT

## 2024-06-11 PROCEDURE — 99213 OFFICE O/P EST LOW 20 MIN: CPT | Performed by: PHYSICIAN ASSISTANT

## 2024-06-11 NOTE — PROGRESS NOTES
"Subjective   Patient ID: Abby Antoine is a 53 y.o. right hand dominant female is being seen for orthopaedic evaluation today for left shoulder   Follow-up of the Left Shoulder         History of Present Illness     Patient presents to review MRI results of the left shoulder.  She was originally experiencing bilateral shoulder pain but her left is more symptomatic so we opted for MRI of the left shoulder.  She mentions she still experiences occasional pain although at the current time, she is feeling \" 80 percent better\"  Patient endorses a remote history of being diagnosed with severe carpal tunnel syndrome to bilateral wrist but she never had treatment other than using night splints.          Past Medical History:   Diagnosis Date    Abnormal Pap smear of cervix     Acid reflux     Anemia     Anxiety     Back pain     Bipolar disorder     CHF (congestive heart failure) 2017    Deafness     Depression     Fibromyalgia     Folic acid deficiency     Gout     Headache     History of blood transfusion     at birth    HTN (hypertension)     Hyperlipidemia     Insomnia     Iron deficiency     Kidney stone     Liver disease     Ovarian cyst     Panic attack     PMS (premenstrual syndrome)     RA (rheumatoid arthritis)     RLS (restless legs syndrome)     Seasonal allergies     Sinus problem     Sleep apnea     Tattoos     Trauma     Urinary tract infection     Vision problems     Vitamin B12 deficiency         Past Surgical History:   Procedure Laterality Date    CERVIX LESION DESTRUCTION      COLONOSCOPY  8- years or so    [facility]    TUBAL ABDOMINAL LIGATION  1994    URETEROSCOPY LASER LITHOTRIPSY WITH STENT INSERTION Left 3/9/2022    Procedure: URETEROSCOPY LASER LITHOTRIPSY WITH STENT INSERTION;  Surgeon: Troy Torres MD;  Location: [facility];  Service: Urology;  Laterality: Left;       Family History   Problem Relation Age of Onset    Coronary artery disease Father     Hypertension Father     " "Melanoma Father     Stroke Maternal Grandmother     Breast cancer Maternal Aunt     Cervical cancer Maternal Aunt     Liver cancer Paternal Aunt     Inflammatory bowel disease Paternal Aunt     Liver cancer Paternal Uncle     Liver cancer Paternal Grandfather     Cirrhosis Maternal Uncle         alcohol induced    Colon cancer Neg Hx         Social History     Socioeconomic History    Marital status:    Tobacco Use    Smoking status: Former     Current packs/day: 0.25     Average packs/day: 0.3 packs/day for 27.4 years (6.9 ttl pk-yrs)     Types: Cigarettes     Start date: 1997    Smokeless tobacco: Never   Vaping Use    Vaping status: Never Used   Substance and Sexual Activity    Alcohol use: Yes     Comment: social    Drug use: Never    Sexual activity: Defer       Allergies   Allergen Reactions    Latex Rash       Review of Systems   Musculoskeletal:  Positive for arthralgias (left shoulder).   Neurological:  Positive for numbness (bilateral hands).       Objective   Temp 98.1 °F (36.7 °C)   Ht 162.6 cm (64\")   Wt 89.9 kg (198 lb 3.2 oz)   BMI 34.02 kg/m²   Physical Exam  Vitals and nursing note reviewed.   Constitutional:       Appearance: Normal appearance.   Pulmonary:      Effort: Pulmonary effort is normal.   Musculoskeletal:      Left shoulder: No deformity, bony tenderness or crepitus. Normal range of motion. Normal strength. Normal pulse.      Left hand: Normal capillary refill. Normal pulse.   Neurological:      Mental Status: She is alert and oriented to person, place, and time.       Left Hand Exam     Tests   Tinel's sign (median nerve): positive      Left Shoulder Exam     Range of Motion   Active abduction:  140   Passive abduction:  150   Extension:  40   External rotation:  90   Forward flexion:  160   Internal rotation 90 degrees:  90     Tests   Apprehension: negative  Drop arm: negative  Sulcus: absent    Other   Pulse: present             Neurologic Exam     Mental Status   Oriented " "to person, place, and time.        Neg. Spurling's test      Assessment & Plan   Independent Review of Radiographic Studies:      X-ray of the cervical spine 2 view performed in the clinic independently reviewed for the evaluation of possible cervical radiculopathy.  No comparison films available for review.  No acute fracture or dislocation.  There is mild degenerative endplate spurring without significant foraminal stenosis      Study Result    Narrative & Impression   PROCEDURE: MRI SHOULDER LEFT WO CONTRAST-     TECHNIQUE: Multiplanar MR of the left shoulder without contrast.     HISTORY: Shoulder pain, rotator cuff disorder suspected, xray done;  M25.512-Pain in left shoulder; M75.42-Impingement syndrome of left  shoulder     FINDINGS:     MARROW SIGNAL PATTERN: There are mild cystic changes in the posterior  humeral head which could be sequela of chronic impingement or rotator  cuff insertion. There is no fracture or contusion.     GLENOHUMERAL JOINT: There are mild degenerative changes. There is a  physiologic joint effusion.     AC JOINT: Subacromial bursitis is seen. There is no rotator cuff  impingement.     LABRUM: Normal morphology without tear.     ROTATOR CUFF APPARATUS: No evidence of tear.     BICEPS TENDON:  Intraarticular long head tendon intact     Abnormal signal changes within the rotator cuff interval are seen. There  is mild thickening of the inferior glenohumeral ligament. Findings may  be seen with adhesive capsulitis.        IMPRESSION:  1. No evidence of labral or rotator cuff tear.  2. Possible adhesive capsulitis.              Images were reviewed, interpreted, and dictated by Dr. Travis Garcia MD       Medical Decision Making:    I explained to the patient I do not feel she is experiencing adhesive capsulitis to the left shoulder despite the MRI revealing possible adhesive capsulitis.  Given her remote history of being told she had \"severe\" carpal tunnel syndrome within normal MRI of " the left shoulder, I would like to pursue additional workup with an updated EMG as this could be causing her upper extremity pain if it is in fact carpal tunnel syndrome.  Also, the EMG will hopefully rule out any type of cervical nerve root impingement.      Procedures  [x] No procedures were performed in office today.    Diagnoses and all orders for this visit:    1. Paresthesia of arm (Primary)  -     EMG & Nerve Conduction Test  -     XR Spine Cervical 2 or 3 View; Future       Discussion of orthopedic goals  Orthopedic activities reviewed and patient expressed appreciation  Risk, benefits, and merits of treatment alternatives reviewed with the patient and questions answered    Recommendations/Plan:  Exercise, medications, injections, other patient advice, and return appointment as noted.  Patient is encouraged to call or return for any issues or concerns.  May continue using the night splints to wrist.    Follow up after EMG    Patient agreeable to call or return sooner for any concerns.

## 2024-06-17 ENCOUNTER — HOSPITAL ENCOUNTER (OUTPATIENT)
Dept: CARDIOLOGY | Facility: HOSPITAL | Age: 54
Discharge: HOME | End: 2024-06-17
Payer: MEDICARE

## 2024-06-17 ENCOUNTER — LAB (OUTPATIENT)
Dept: LAB | Facility: HOSPITAL | Age: 54
End: 2024-06-17
Payer: MEDICARE

## 2024-06-17 ENCOUNTER — TRANSCRIBE ORDERS (OUTPATIENT)
Dept: LAB | Facility: HOSPITAL | Age: 54
End: 2024-06-17
Payer: MEDICARE

## 2024-06-17 DIAGNOSIS — Z01.818 PRE-OP TESTING: Primary | ICD-10-CM

## 2024-06-17 DIAGNOSIS — Z01.818 PRE-OP TESTING: ICD-10-CM

## 2024-06-17 LAB
ALBUMIN SERPL-MCNC: 3.9 G/DL (ref 3.5–5.2)
ALBUMIN/GLOB SERPL: 1.4 G/DL
ALP SERPL-CCNC: 78 U/L (ref 39–117)
ALT SERPL W P-5'-P-CCNC: 16 U/L (ref 1–33)
ANION GAP SERPL CALCULATED.3IONS-SCNC: 8.2 MMOL/L (ref 5–15)
AST SERPL-CCNC: 26 U/L (ref 1–32)
BILIRUB SERPL-MCNC: 0.3 MG/DL (ref 0–1.2)
BUN SERPL-MCNC: 18 MG/DL (ref 6–20)
BUN/CREAT SERPL: 17 (ref 7–25)
CALCIUM SPEC-SCNC: 9 MG/DL (ref 8.6–10.5)
CHLORIDE SERPL-SCNC: 105 MMOL/L (ref 98–107)
CO2 SERPL-SCNC: 24.8 MMOL/L (ref 22–29)
CREAT SERPL-MCNC: 1.06 MG/DL (ref 0.57–1)
DEPRECATED RDW RBC AUTO: 43.4 FL (ref 37–54)
EGFRCR SERPLBLD CKD-EPI 2021: 63.7 ML/MIN/1.73
ERYTHROCYTE [DISTWIDTH] IN BLOOD BY AUTOMATED COUNT: 14.4 % (ref 12.3–15.4)
GLOBULIN UR ELPH-MCNC: 2.7 GM/DL
GLUCOSE SERPL-MCNC: 99 MG/DL (ref 65–99)
HCT VFR BLD AUTO: 41.2 % (ref 34–46.6)
HGB BLD-MCNC: 13.2 G/DL (ref 12–15.9)
MCH RBC QN AUTO: 26.7 PG (ref 26.6–33)
MCHC RBC AUTO-ENTMCNC: 32 G/DL (ref 31.5–35.7)
MCV RBC AUTO: 83.4 FL (ref 79–97)
PLATELET # BLD AUTO: 326 10*3/MM3 (ref 140–450)
PMV BLD AUTO: 10 FL (ref 6–12)
POTASSIUM SERPL-SCNC: 4 MMOL/L (ref 3.5–5.2)
PROT SERPL-MCNC: 6.6 G/DL (ref 6–8.5)
RBC # BLD AUTO: 4.94 10*6/MM3 (ref 3.77–5.28)
SODIUM SERPL-SCNC: 138 MMOL/L (ref 136–145)
WBC NRBC COR # BLD AUTO: 5.87 10*3/MM3 (ref 3.4–10.8)

## 2024-06-17 PROCEDURE — 85027 COMPLETE CBC AUTOMATED: CPT

## 2024-06-17 PROCEDURE — 80053 COMPREHEN METABOLIC PANEL: CPT

## 2024-06-17 PROCEDURE — 36415 COLL VENOUS BLD VENIPUNCTURE: CPT

## 2024-06-17 PROCEDURE — 93005 ELECTROCARDIOGRAM TRACING: CPT | Performed by: OTOLARYNGOLOGY

## 2024-06-18 LAB
QT INTERVAL: 390 MS
QTC INTERVAL: 444 MS

## 2024-06-19 ENCOUNTER — PROCEDURE VISIT (OUTPATIENT)
Dept: ORTHOPEDIC SURGERY | Facility: CLINIC | Age: 54
End: 2024-06-19
Payer: MEDICARE

## 2024-06-19 DIAGNOSIS — R20.2 PARESTHESIA: ICD-10-CM

## 2024-06-19 DIAGNOSIS — G56.03 CARPAL TUNNEL SYNDROME, BILATERAL: Primary | ICD-10-CM

## 2024-06-24 ENCOUNTER — OFFICE VISIT (OUTPATIENT)
Dept: ORTHOPEDIC SURGERY | Facility: CLINIC | Age: 54
End: 2024-06-24
Payer: MEDICARE

## 2024-06-24 VITALS
SYSTOLIC BLOOD PRESSURE: 98 MMHG | WEIGHT: 192 LBS | BODY MASS INDEX: 32.78 KG/M2 | HEIGHT: 64 IN | DIASTOLIC BLOOD PRESSURE: 72 MMHG

## 2024-06-24 DIAGNOSIS — M25.512 ARTHRALGIA OF LEFT SHOULDER REGION: ICD-10-CM

## 2024-06-24 DIAGNOSIS — R20.2 PARESTHESIA OF ARM: Primary | ICD-10-CM

## 2024-06-24 PROCEDURE — 99213 OFFICE O/P EST LOW 20 MIN: CPT | Performed by: PHYSICIAN ASSISTANT

## 2024-06-24 PROCEDURE — 1159F MED LIST DOCD IN RCRD: CPT | Performed by: PHYSICIAN ASSISTANT

## 2024-06-24 PROCEDURE — 1160F RVW MEDS BY RX/DR IN RCRD: CPT | Performed by: PHYSICIAN ASSISTANT

## 2024-06-24 RX ORDER — OFLOXACIN 3 MG/ML
SOLUTION AURICULAR (OTIC)
COMMUNITY
Start: 2024-06-20

## 2024-06-24 RX ORDER — CEFDINIR 300 MG/1
1 CAPSULE ORAL EVERY 12 HOURS SCHEDULED
COMMUNITY
Start: 2024-06-20

## 2024-06-24 RX ORDER — PREDNISONE 20 MG/1
2 TABLET ORAL DAILY
COMMUNITY
Start: 2024-06-20

## 2024-06-24 NOTE — PROGRESS NOTES
Subjective   Patient ID: Abby Antoine is a 53 y.o. right hand dominant female is being seen for orthopaedic evaluation today for left shoulder   Follow-up of the Left Shoulder (Here to review EMG results )         History of Present Illness  Patient presents to review EMG results of the bilateral upper extremity.  She has been experiencing intermittent left shoulder pain worse with vigorous activity.  She also mentioned intermittent numbness and tingling and occasional cervical spine pain.  A recent MRI of the left shoulder was performed and did not reveal any acute pathology.  She did have remote blood work which included sed rate and CRP in 9/2023 that was within normal limits.                                                 Past Medical History:   Diagnosis Date    Abnormal Pap smear of cervix     Acid reflux     Anemia     Anxiety     Back pain     Bipolar disorder     CHF (congestive heart failure) 2017    Deafness     Depression     Fibromyalgia     Folic acid deficiency     Gout     Headache     History of blood transfusion     at birth    HTN (hypertension)     Hyperlipidemia     Insomnia     Iron deficiency     Kidney stone     Liver disease     Ovarian cyst     Panic attack     PMS (premenstrual syndrome)     RA (rheumatoid arthritis)     RLS (restless legs syndrome)     Seasonal allergies     Sinus problem     Sleep apnea     Tattoos     Trauma     Urinary tract infection     Vision problems     Vitamin B12 deficiency         Past Surgical History:   Procedure Laterality Date    CERVIX LESION DESTRUCTION      COLONOSCOPY  8- years or so    [facility]    TUBAL ABDOMINAL LIGATION  1994    URETEROSCOPY LASER LITHOTRIPSY WITH STENT INSERTION Left 3/9/2022    Procedure: URETEROSCOPY LASER LITHOTRIPSY WITH STENT INSERTION;  Surgeon: Troy Torres MD;  Location: [facility];  Service: Urology;  Laterality: Left;       Family History   Problem Relation Age of Onset    Coronary artery disease  "Father     Hypertension Father     Melanoma Father     Stroke Maternal Grandmother     Breast cancer Maternal Aunt     Cervical cancer Maternal Aunt     Liver cancer Paternal Aunt     Inflammatory bowel disease Paternal Aunt     Liver cancer Paternal Uncle     Liver cancer Paternal Grandfather     Cirrhosis Maternal Uncle         alcohol induced    Colon cancer Neg Hx         Social History     Socioeconomic History    Marital status:    Tobacco Use    Smoking status: Former     Current packs/day: 0.25     Average packs/day: 0.3 packs/day for 27.5 years (6.9 ttl pk-yrs)     Types: Cigarettes     Start date: 1997    Smokeless tobacco: Never   Vaping Use    Vaping status: Never Used   Substance and Sexual Activity    Alcohol use: Yes     Comment: social    Drug use: Never    Sexual activity: Defer       Allergies   Allergen Reactions    Latex Rash       Review of Systems   Musculoskeletal:  Positive for arthralgias (left shoulder).       Objective   BP 98/72 (BP Location: Left arm, Patient Position: Sitting, Cuff Size: Adult)   Ht 162.6 cm (64\")   Wt 87.1 kg (192 lb)   BMI 32.96 kg/m²   Physical Exam  Vitals and nursing note reviewed.   Constitutional:       Appearance: Normal appearance.   Pulmonary:      Effort: Pulmonary effort is normal.   Musculoskeletal:      Right shoulder: No deformity or crepitus. Normal range of motion. Normal strength.      Left shoulder: No deformity or crepitus. Normal range of motion. Normal strength.      Cervical back: Bony tenderness present. No deformity. No pain with movement.   Neurological:      Mental Status: She is alert and oriented to person, place, and time.       Right Shoulder Exam     Range of Motion   The patient has normal right shoulder ROM.    Tests   Drop arm: negative      Left Shoulder Exam     Range of Motion   The patient has normal left shoulder ROM.    Tests   Drop arm: negative              Neurologic Exam     Mental Status   Oriented to person, " place, and time.            Assessment & Plan   Independent Review of Radiographic Studies:    No new imaging done today.  Laboratory and Other Studies:  No new results reviewed today.   I have reviewed the EMG results with the patient.  The right upper extremity reveals very minor carpal tunnel syndrome.  No evidence of left upper extremity carpal tunnel syndrome.    Procedures  [x] No procedures were performed in office today.    Diagnoses and all orders for this visit:    1. Paresthesia of arm (Primary)    2. Arthralgia of left shoulder region       Discussion of orthopedic goals  Orthopedic activities reviewed and patient expressed appreciation  Regular exercise as tolerated  Ice, heat, and/or modalities as beneficial    Recommendations/Plan:  I did provide the patient with contact information for [facility] to have her spine formally evaluated.  At this time, she is without pain and states her shoulders feel back to normal.  With a recent MRI of the left shoulder being normal and a normal left upper extremity EMG this could be related to cervical issues and/or her history of rheumatoid arthritis with vigorous activity.  Patient is welcome to come back to the clinic at any point in time if any symptoms change or any concerns arise.    Patient agreeable to call or return sooner for any concerns.

## 2024-06-27 ENCOUNTER — LAB REQUISITION (OUTPATIENT)
Dept: LAB | Facility: HOSPITAL | Age: 54
End: 2024-06-27
Payer: MEDICARE

## 2024-06-27 DIAGNOSIS — J34.2 DEVIATED NASAL SEPTUM: ICD-10-CM

## 2024-06-27 PROCEDURE — 88305 TISSUE EXAM BY PATHOLOGIST: CPT | Performed by: OTOLARYNGOLOGY

## 2024-06-27 PROCEDURE — 88311 DECALCIFY TISSUE: CPT | Performed by: OTOLARYNGOLOGY

## 2024-07-01 LAB
CYTO UR: NORMAL
LAB AP CASE REPORT: NORMAL
LAB AP CLINICAL INFORMATION: NORMAL
PATH REPORT.FINAL DX SPEC: NORMAL
PATH REPORT.GROSS SPEC: NORMAL

## 2024-09-12 ENCOUNTER — OFFICE VISIT (OUTPATIENT)
Dept: OBSTETRICS AND GYNECOLOGY | Facility: CLINIC | Age: 54
End: 2024-09-12
Payer: MEDICARE

## 2024-09-12 VITALS
DIASTOLIC BLOOD PRESSURE: 80 MMHG | WEIGHT: 177.2 LBS | HEIGHT: 64 IN | SYSTOLIC BLOOD PRESSURE: 110 MMHG | BODY MASS INDEX: 30.25 KG/M2

## 2024-09-12 DIAGNOSIS — Z01.419 WELL WOMAN EXAM WITH ROUTINE GYNECOLOGICAL EXAM: Primary | ICD-10-CM

## 2024-09-12 DIAGNOSIS — N95.2 VAGINAL ATROPHY: ICD-10-CM

## 2024-09-12 DIAGNOSIS — Z12.4 SCREENING FOR CERVICAL CANCER: ICD-10-CM

## 2024-09-12 DIAGNOSIS — Z12.31 ENCOUNTER FOR SCREENING MAMMOGRAM FOR MALIGNANT NEOPLASM OF BREAST: ICD-10-CM

## 2024-09-12 DIAGNOSIS — N94.10 DYSPAREUNIA IN FEMALE: ICD-10-CM

## 2024-09-12 RX ORDER — CYCLOSPORINE 0.5 MG/ML
EMULSION OPHTHALMIC
COMMUNITY
Start: 2024-08-01

## 2024-09-12 RX ORDER — ESTRADIOL 0.1 MG/G
CREAM VAGINAL
Qty: 42.5 G | Refills: 4 | Status: SHIPPED | OUTPATIENT
Start: 2024-09-12

## 2024-09-12 NOTE — PATIENT INSTRUCTIONS
Self breast exam monthly  Annual mammogram  Calcium 1200 mg daily and vit D 2000 mg daily  Regular excercise   Discussed treatment option for vasomotor symptoms including over-the-counter natural options, hormone replacement therapy, and Veozah.  For now patient thinks she may trial over-the-counter Estroven

## 2024-09-12 NOTE — PROGRESS NOTES
Subjective   Chief Complaint   Patient presents with    Gynecologic Exam     Last pap done 21-WNL, MMG is due.  C/O vaginal dryness, dyspareunia, discuss hormones       Abby Antoine is a 54 y.o. year old  presenting to be seen for her annual gynecological exam.   Having issues with vaginal dryness and painful intercourse  LMP 2022  Would like order for screening mammogram  Having some hot flashes and night sweats also     Past Medical History:   Diagnosis Date    Abnormal Pap smear of cervix     Acid reflux     Anemia     Anxiety     Back pain     Bipolar disorder     CHF (congestive heart failure) 2017    Deafness     Depression     Fibromyalgia     Folic acid deficiency     Gout     Headache     History of blood transfusion     at birth    HTN (hypertension)     Hyperlipidemia     Insomnia     Iron deficiency     Kidney stone     Liver disease     Migraine     Ovarian cyst     Panic attack     PMS (premenstrual syndrome)     RA (rheumatoid arthritis)     RLS (restless legs syndrome)     Seasonal allergies     Sinus problem     Sleep apnea     Tattoos     Trauma     Urinary tract infection     Vision problems     Vitamin B12 deficiency         Current Outpatient Medications:     Adalimumab (Humira Pen) 40 MG/0.8ML Pen-injector Kit, 40 mg., Disp: , Rfl:     Restasis 0.05 % ophthalmic emulsion, instill 1 drop into each eye twice daily, Disp: , Rfl:     estradiol (ESTRACE VAGINAL) 0.1 MG/GM vaginal cream, Insert 1 gm intravaginally 2 times each week, Disp: 42.5 g, Rfl: 4   Allergies   Allergen Reactions    Latex Rash      Past Surgical History:   Procedure Laterality Date    CERVIX LESION DESTRUCTION      COLONOSCOPY  8- years or so    [facility]    TUBAL ABDOMINAL LIGATION      URETEROSCOPY LASER LITHOTRIPSY WITH STENT INSERTION Left 3/9/2022    Procedure: URETEROSCOPY LASER LITHOTRIPSY WITH STENT INSERTION;  Surgeon: Troy Torres MD;  Location: [facility];  Service: Urology;  " Laterality: Left;      Social History     Socioeconomic History    Marital status:    Tobacco Use    Smoking status: Former     Current packs/day: 0.25     Average packs/day: 0.3 packs/day for 27.7 years (6.9 ttl pk-yrs)     Types: Cigarettes     Start date: 1997    Smokeless tobacco: Never   Vaping Use    Vaping status: Never Used   Substance and Sexual Activity    Alcohol use: Yes     Comment: social    Drug use: Never    Sexual activity: Yes     Partners: Male     Birth control/protection: Tubal ligation      Family History   Problem Relation Age of Onset    Coronary artery disease Father     Hypertension Father     Melanoma Father     Stroke Maternal Grandmother     Breast cancer Maternal Aunt     Cervical cancer Maternal Aunt     Liver cancer Paternal Aunt     Inflammatory bowel disease Paternal Aunt     Liver cancer Paternal Uncle     Liver cancer Paternal Grandfather     Cirrhosis Maternal Uncle         alcohol induced    Colon cancer Neg Hx        Review of Systems   Constitutional: Negative.    Gastrointestinal: Negative.    Endocrine: Positive for heat intolerance.   Genitourinary:  Positive for dyspareunia and vaginal pain. Negative for difficulty urinating, dysuria, pelvic pain and vaginal bleeding.           Objective   /80   Ht 162.6 cm (64\")   Wt 80.4 kg (177 lb 3.2 oz)   LMP 02/18/2022 Comment: urine preg negative  BMI 30.42 kg/m²     Physical Exam  Exam conducted with a chaperone present.   Constitutional:       Appearance: Normal appearance. She is well-developed and well-groomed.   Eyes:      General: Lids are normal.      Extraocular Movements: Extraocular movements intact.      Conjunctiva/sclera: Conjunctivae normal.   Neck:      Thyroid: No thyroid mass.   Chest:   Breasts:     Breasts are symmetrical.      Right: No inverted nipple, mass, nipple discharge, skin change or tenderness.      Left: No inverted nipple, mass, nipple discharge, skin change or tenderness. "   Abdominal:      General: There is no distension.      Palpations: Abdomen is soft.      Tenderness: There is no abdominal tenderness.   Genitourinary:     Exam position: Lithotomy position.      Labia:         Right: No rash, tenderness or lesion.         Left: No rash, tenderness or lesion.       Urethra: No prolapse, urethral pain, urethral swelling or urethral lesion.      Vagina: No vaginal discharge, tenderness or lesions.      Cervix: No cervical motion tenderness, discharge, friability or lesion.      Uterus: Not enlarged and not tender.       Adnexa:         Right: No mass or tenderness.          Left: No mass or tenderness.        Comments: Vaginal tissue atrophic  Musculoskeletal:      Cervical back: Neck supple.   Lymphadenopathy:      Upper Body:      Right upper body: No axillary adenopathy.      Left upper body: No axillary adenopathy.   Skin:     General: Skin is warm and dry.      Findings: No lesion.   Neurological:      General: No focal deficit present.      Mental Status: She is alert and oriented to person, place, and time.   Psychiatric:         Attention and Perception: Attention normal.         Mood and Affect: Mood normal.         Speech: Speech normal.         Behavior: Behavior normal.         Thought Content: Thought content normal.            Result Review :           Mammo Screening Digital Tomosynthesis Bilateral With CAD (07/19/2022 14:57)         Assessment and Plan  Diagnoses and all orders for this visit:    1. Well woman exam with routine gynecological exam (Primary)    2. Screening for cervical cancer  -     LIQUID-BASED PAP SMEAR WITH HPV GENOTYPING IF ASCUS ([facility])    3. Dyspareunia in female    4. Vaginal atrophy    5. Encounter for screening mammogram for malignant neoplasm of breast  -     Mammo Screening Digital Tomosynthesis Bilateral With CAD; Future    Other orders  -     estradiol (ESTRACE VAGINAL) 0.1 MG/GM vaginal cream; Insert 1 gm intravaginally 2 times each  week  Dispense: 42.5 g; Refill: 4      Patient Instructions   Self breast exam monthly  Annual mammogram  Calcium 1200 mg daily and vit D 2000 mg daily  Regular excercise   Discussed treatment option for vasomotor symptoms including over-the-counter natural options, hormone replacement therapy, and Veozah.  For now patient thinks she may trial over-the-counter Estroven           This note was electronically signed.    Sana Canas PA-C   September 12, 2024

## 2024-09-19 LAB — REF LAB TEST METHOD: NORMAL

## 2024-09-26 LAB
QT INTERVAL: 390 MS
QTC INTERVAL: 444 MS

## 2024-12-16 ENCOUNTER — HOSPITAL ENCOUNTER (OUTPATIENT)
Dept: MAMMOGRAPHY | Facility: HOSPITAL | Age: 54
Discharge: HOME | End: 2024-12-16
Admitting: PHYSICIAN ASSISTANT
Payer: MEDICARE

## 2024-12-16 DIAGNOSIS — Z12.31 ENCOUNTER FOR SCREENING MAMMOGRAM FOR MALIGNANT NEOPLASM OF BREAST: ICD-10-CM

## 2024-12-16 PROCEDURE — 77067 SCR MAMMO BI INCL CAD: CPT

## 2024-12-16 PROCEDURE — 77063 BREAST TOMOSYNTHESIS BI: CPT

## 2024-12-20 PROCEDURE — 77067 SCR MAMMO BI INCL CAD: CPT | Performed by: RADIOLOGY

## 2024-12-20 PROCEDURE — 77063 BREAST TOMOSYNTHESIS BI: CPT | Performed by: RADIOLOGY

## 2025-02-19 ENCOUNTER — HOSPITAL ENCOUNTER (EMERGENCY)
Facility: HOSPITAL | Age: 55
Discharge: HOME | End: 2025-02-19
Attending: STUDENT IN AN ORGANIZED HEALTH CARE EDUCATION/TRAINING PROGRAM | Admitting: STUDENT IN AN ORGANIZED HEALTH CARE EDUCATION/TRAINING PROGRAM
Payer: MEDICARE

## 2025-02-19 ENCOUNTER — APPOINTMENT (OUTPATIENT)
Dept: GENERAL RADIOLOGY | Facility: HOSPITAL | Age: 55
End: 2025-02-19
Payer: MEDICARE

## 2025-02-19 ENCOUNTER — APPOINTMENT (OUTPATIENT)
Dept: CT IMAGING | Facility: HOSPITAL | Age: 55
End: 2025-02-19
Payer: MEDICARE

## 2025-02-19 VITALS
RESPIRATION RATE: 18 BRPM | TEMPERATURE: 97.7 F | BODY MASS INDEX: 31.92 KG/M2 | OXYGEN SATURATION: 96 % | HEART RATE: 70 BPM | HEIGHT: 64 IN | WEIGHT: 187 LBS | SYSTOLIC BLOOD PRESSURE: 128 MMHG | DIASTOLIC BLOOD PRESSURE: 83 MMHG

## 2025-02-19 DIAGNOSIS — R10.9 LEFT FLANK PAIN: ICD-10-CM

## 2025-02-19 DIAGNOSIS — R10.12 LEFT UPPER QUADRANT ABDOMINAL PAIN: Primary | ICD-10-CM

## 2025-02-19 DIAGNOSIS — N39.0 URINARY TRACT INFECTION WITHOUT HEMATURIA, SITE UNSPECIFIED: ICD-10-CM

## 2025-02-19 DIAGNOSIS — N20.0 LEFT NEPHROLITHIASIS: ICD-10-CM

## 2025-02-19 LAB
ALBUMIN SERPL-MCNC: 3.9 G/DL (ref 3.5–5.2)
ALBUMIN/GLOB SERPL: 1.3 G/DL
ALP SERPL-CCNC: 109 U/L (ref 39–117)
ALT SERPL W P-5'-P-CCNC: 16 U/L (ref 1–33)
ANION GAP SERPL CALCULATED.3IONS-SCNC: 13.5 MMOL/L (ref 5–15)
AST SERPL-CCNC: 24 U/L (ref 1–32)
BACTERIA UR QL AUTO: ABNORMAL /HPF
BASOPHILS # BLD AUTO: 0.1 10*3/MM3 (ref 0–0.2)
BASOPHILS NFR BLD AUTO: 1.2 % (ref 0–1.5)
BILIRUB SERPL-MCNC: 0.2 MG/DL (ref 0–1.2)
BILIRUB UR QL STRIP: NEGATIVE
BUN SERPL-MCNC: 16 MG/DL (ref 6–20)
BUN/CREAT SERPL: 18.8 (ref 7–25)
CALCIUM SPEC-SCNC: 8.8 MG/DL (ref 8.6–10.5)
CHLORIDE SERPL-SCNC: 102 MMOL/L (ref 98–107)
CLARITY UR: CLEAR
CO2 SERPL-SCNC: 22.5 MMOL/L (ref 22–29)
COLOR UR: ABNORMAL
CREAT SERPL-MCNC: 0.85 MG/DL (ref 0.57–1)
D DIMER PPP FEU-MCNC: <0.27 MCGFEU/ML (ref 0–0.52)
DEPRECATED RDW RBC AUTO: 41.5 FL (ref 37–54)
EGFRCR SERPLBLD CKD-EPI 2021: 82.6 ML/MIN/1.73
EOSINOPHIL # BLD AUTO: 0.45 10*3/MM3 (ref 0–0.4)
EOSINOPHIL NFR BLD AUTO: 5.3 % (ref 0.3–6.2)
ERYTHROCYTE [DISTWIDTH] IN BLOOD BY AUTOMATED COUNT: 13.6 % (ref 12.3–15.4)
GEN 5 1HR TROPONIN T REFLEX: <6 NG/L
GLOBULIN UR ELPH-MCNC: 3 GM/DL
GLUCOSE SERPL-MCNC: 104 MG/DL (ref 65–99)
GLUCOSE UR STRIP-MCNC: NEGATIVE MG/DL
HCT VFR BLD AUTO: 41.3 % (ref 34–46.6)
HGB BLD-MCNC: 13.5 G/DL (ref 12–15.9)
HGB UR QL STRIP.AUTO: NEGATIVE
HOLD SPECIMEN: NORMAL
HOLD SPECIMEN: NORMAL
HYALINE CASTS UR QL AUTO: ABNORMAL /LPF
IMM GRANULOCYTES # BLD AUTO: 0.05 10*3/MM3 (ref 0–0.05)
IMM GRANULOCYTES NFR BLD AUTO: 0.6 % (ref 0–0.5)
KETONES UR QL STRIP: NEGATIVE
LEUKOCYTE ESTERASE UR QL STRIP.AUTO: ABNORMAL
LIPASE SERPL-CCNC: 40 U/L (ref 13–60)
LYMPHOCYTES # BLD AUTO: 3.19 10*3/MM3 (ref 0.7–3.1)
LYMPHOCYTES NFR BLD AUTO: 37.3 % (ref 19.6–45.3)
MCH RBC QN AUTO: 27.3 PG (ref 26.6–33)
MCHC RBC AUTO-ENTMCNC: 32.7 G/DL (ref 31.5–35.7)
MCV RBC AUTO: 83.4 FL (ref 79–97)
MONOCYTES # BLD AUTO: 0.57 10*3/MM3 (ref 0.1–0.9)
MONOCYTES NFR BLD AUTO: 6.7 % (ref 5–12)
NEUTROPHILS NFR BLD AUTO: 4.2 10*3/MM3 (ref 1.7–7)
NEUTROPHILS NFR BLD AUTO: 48.9 % (ref 42.7–76)
NITRITE UR QL STRIP: POSITIVE
NRBC BLD AUTO-RTO: 0 /100 WBC (ref 0–0.2)
PH UR STRIP.AUTO: 6.5 [PH] (ref 5–8)
PLATELET # BLD AUTO: 286 10*3/MM3 (ref 140–450)
PMV BLD AUTO: 9.5 FL (ref 6–12)
POTASSIUM SERPL-SCNC: 3.8 MMOL/L (ref 3.5–5.2)
PROT SERPL-MCNC: 6.9 G/DL (ref 6–8.5)
PROT UR QL STRIP: NEGATIVE
RBC # BLD AUTO: 4.95 10*6/MM3 (ref 3.77–5.28)
RBC # UR STRIP: ABNORMAL /HPF
REF LAB TEST METHOD: ABNORMAL
SODIUM SERPL-SCNC: 138 MMOL/L (ref 136–145)
SP GR UR STRIP: 1.02 (ref 1–1.03)
SQUAMOUS #/AREA URNS HPF: ABNORMAL /HPF
TROPONIN T NUMERIC DELTA: NORMAL
TROPONIN T SERPL HS-MCNC: <6 NG/L
UROBILINOGEN UR QL STRIP: ABNORMAL
WBC # UR STRIP: ABNORMAL /HPF
WBC NRBC COR # BLD AUTO: 8.56 10*3/MM3 (ref 3.4–10.8)
WHOLE BLOOD HOLD COAG: NORMAL
WHOLE BLOOD HOLD SPECIMEN: NORMAL

## 2025-02-19 PROCEDURE — 83690 ASSAY OF LIPASE: CPT | Performed by: STUDENT IN AN ORGANIZED HEALTH CARE EDUCATION/TRAINING PROGRAM

## 2025-02-19 PROCEDURE — 71275 CT ANGIOGRAPHY CHEST: CPT

## 2025-02-19 PROCEDURE — 93005 ELECTROCARDIOGRAM TRACING: CPT | Performed by: STUDENT IN AN ORGANIZED HEALTH CARE EDUCATION/TRAINING PROGRAM

## 2025-02-19 PROCEDURE — 96374 THER/PROPH/DIAG INJ IV PUSH: CPT

## 2025-02-19 PROCEDURE — 71045 X-RAY EXAM CHEST 1 VIEW: CPT

## 2025-02-19 PROCEDURE — 80053 COMPREHEN METABOLIC PANEL: CPT | Performed by: STUDENT IN AN ORGANIZED HEALTH CARE EDUCATION/TRAINING PROGRAM

## 2025-02-19 PROCEDURE — 25510000001 IOPAMIDOL 61 % SOLUTION: Performed by: STUDENT IN AN ORGANIZED HEALTH CARE EDUCATION/TRAINING PROGRAM

## 2025-02-19 PROCEDURE — 85379 FIBRIN DEGRADATION QUANT: CPT | Performed by: STUDENT IN AN ORGANIZED HEALTH CARE EDUCATION/TRAINING PROGRAM

## 2025-02-19 PROCEDURE — 25010000002 DIPHENHYDRAMINE PER 50 MG: Performed by: STUDENT IN AN ORGANIZED HEALTH CARE EDUCATION/TRAINING PROGRAM

## 2025-02-19 PROCEDURE — 85025 COMPLETE CBC W/AUTO DIFF WBC: CPT | Performed by: STUDENT IN AN ORGANIZED HEALTH CARE EDUCATION/TRAINING PROGRAM

## 2025-02-19 PROCEDURE — 84484 ASSAY OF TROPONIN QUANT: CPT | Performed by: STUDENT IN AN ORGANIZED HEALTH CARE EDUCATION/TRAINING PROGRAM

## 2025-02-19 PROCEDURE — 36415 COLL VENOUS BLD VENIPUNCTURE: CPT

## 2025-02-19 PROCEDURE — 25010000002 KETOROLAC TROMETHAMINE PER 15 MG: Performed by: STUDENT IN AN ORGANIZED HEALTH CARE EDUCATION/TRAINING PROGRAM

## 2025-02-19 PROCEDURE — 25010000002 METOCLOPRAMIDE PER 10 MG: Performed by: STUDENT IN AN ORGANIZED HEALTH CARE EDUCATION/TRAINING PROGRAM

## 2025-02-19 PROCEDURE — 81001 URINALYSIS AUTO W/SCOPE: CPT | Performed by: STUDENT IN AN ORGANIZED HEALTH CARE EDUCATION/TRAINING PROGRAM

## 2025-02-19 PROCEDURE — 96375 TX/PRO/DX INJ NEW DRUG ADDON: CPT

## 2025-02-19 PROCEDURE — 74177 CT ABD & PELVIS W/CONTRAST: CPT

## 2025-02-19 PROCEDURE — 99285 EMERGENCY DEPT VISIT HI MDM: CPT | Performed by: STUDENT IN AN ORGANIZED HEALTH CARE EDUCATION/TRAINING PROGRAM

## 2025-02-19 RX ORDER — IOPAMIDOL 612 MG/ML
100 INJECTION, SOLUTION INTRAVASCULAR
Status: COMPLETED | OUTPATIENT
Start: 2025-02-19 | End: 2025-02-19

## 2025-02-19 RX ORDER — DIPHENHYDRAMINE HYDROCHLORIDE 50 MG/ML
12.5 INJECTION INTRAMUSCULAR; INTRAVENOUS ONCE
Status: COMPLETED | OUTPATIENT
Start: 2025-02-19 | End: 2025-02-19

## 2025-02-19 RX ORDER — SODIUM CHLORIDE 0.9 % (FLUSH) 0.9 %
10 SYRINGE (ML) INJECTION AS NEEDED
Status: DISCONTINUED | OUTPATIENT
Start: 2025-02-19 | End: 2025-02-19 | Stop reason: HOSPADM

## 2025-02-19 RX ORDER — KETOROLAC TROMETHAMINE 30 MG/ML
15 INJECTION, SOLUTION INTRAMUSCULAR; INTRAVENOUS ONCE
Status: COMPLETED | OUTPATIENT
Start: 2025-02-19 | End: 2025-02-19

## 2025-02-19 RX ORDER — FLUCONAZOLE 150 MG/1
150 TABLET ORAL ONCE
Qty: 1 TABLET | Refills: 0 | Status: SHIPPED | OUTPATIENT
Start: 2025-02-19 | End: 2025-02-20

## 2025-02-19 RX ORDER — ASPIRIN 325 MG
325 TABLET ORAL ONCE
Status: COMPLETED | OUTPATIENT
Start: 2025-02-19 | End: 2025-02-19

## 2025-02-19 RX ORDER — METOCLOPRAMIDE HYDROCHLORIDE 5 MG/ML
5 INJECTION INTRAMUSCULAR; INTRAVENOUS ONCE
Status: COMPLETED | OUTPATIENT
Start: 2025-02-19 | End: 2025-02-19

## 2025-02-19 RX ORDER — CEPHALEXIN 500 MG/1
500 CAPSULE ORAL 2 TIMES DAILY
Qty: 10 CAPSULE | Refills: 0 | Status: SHIPPED | OUTPATIENT
Start: 2025-02-19 | End: 2025-02-24

## 2025-02-19 RX ORDER — PANTOPRAZOLE SODIUM 40 MG/10ML
40 INJECTION, POWDER, LYOPHILIZED, FOR SOLUTION INTRAVENOUS ONCE
Status: COMPLETED | OUTPATIENT
Start: 2025-02-19 | End: 2025-02-19

## 2025-02-19 RX ORDER — ONDANSETRON 4 MG/1
4 TABLET, ORALLY DISINTEGRATING ORAL 4 TIMES DAILY PRN
Qty: 12 TABLET | Refills: 0 | Status: SHIPPED | OUTPATIENT
Start: 2025-02-19

## 2025-02-19 RX ADMIN — IOPAMIDOL 100 ML: 612 INJECTION, SOLUTION INTRAVENOUS at 08:22

## 2025-02-19 RX ADMIN — ASPIRIN 325 MG: 325 TABLET ORAL at 07:17

## 2025-02-19 RX ADMIN — KETOROLAC TROMETHAMINE 15 MG: 30 INJECTION, SOLUTION INTRAMUSCULAR; INTRAVENOUS at 08:55

## 2025-02-19 RX ADMIN — DIPHENHYDRAMINE HYDROCHLORIDE 12.5 MG: 50 INJECTION, SOLUTION INTRAMUSCULAR; INTRAVENOUS at 08:57

## 2025-02-19 RX ADMIN — PANTOPRAZOLE SODIUM 40 MG: 40 INJECTION, POWDER, FOR SOLUTION INTRAVENOUS at 08:53

## 2025-02-19 RX ADMIN — METOCLOPRAMIDE 5 MG: 5 INJECTION, SOLUTION INTRAMUSCULAR; INTRAVENOUS at 08:59

## 2025-02-19 NOTE — DISCHARGE INSTRUCTIONS
Take Zofran as needed for nausea/vomiting  Take ibuprofen as needed for pain  Take antibiotic as prescribed for urinary tract infection  Follow-up closely with your primary care provider to monitor your symptoms you may also benefit from urology referral  Do not hesitate to return if symptoms worsen in any way

## 2025-02-19 NOTE — ED PROVIDER NOTES
[facility] EMERGENCY DEPARTMENT  Emergency Department Encounter  Emergency Medicine Physician Note       Pt Name: Abby Antoine  MRN: [id number]  Pt :   1970  Room Number:    Date of encounter:  2025  PCP: Brittany Juan APRN  ED Provider: Berlin Miguel MD    Historian: Patient      HPI:  Chief Complaint: Chest and abdominal pain        Context: Abby Antoine is a 54 y.o. female who presents to the ED for chest and abdominal pain.  Symptoms started last night.  Patient states it is left-sided chest and upper abdomen as well as left flank.  Denies known triggers.  She has associated nausea.  States prior history of kidney stones but pain not as severe as then.  No fevers.  Denies cough.  She has some mild shortness of breath states it is painful to take a deep breath.  She states she had a hamburger helper last night she is uncertain if this triggered her symptoms.  She reports some dysuria ongoing for the past 3 days.  Denies history of cardiac issues.  Denies tobacco or alcohol use.      PAST MEDICAL HISTORY  Past Medical History:   Diagnosis Date    Abnormal Pap smear of cervix     Acid reflux     Anemia     Anxiety     Back pain     Bipolar disorder     CHF (congestive heart failure) 2017    Deafness     Depression     Fibromyalgia     Folic acid deficiency     Gout     Headache     History of blood transfusion     at birth    HTN (hypertension)     Hyperlipidemia     Insomnia     Iron deficiency     Kidney stone     Liver disease     Migraine     Ovarian cyst     Panic attack     PMS (premenstrual syndrome)     RA (rheumatoid arthritis)     RLS (restless legs syndrome)     Seasonal allergies     Sinus problem     Sleep apnea     Tattoos     Trauma     Urinary tract infection     Vision problems     Vitamin B12 deficiency          PAST SURGICAL HISTORY  Past Surgical History:   Procedure Laterality Date    CERVIX LESION DESTRUCTION      COLONOSCOPY  8- years or so     [place] [facility]    TUBAL ABDOMINAL LIGATION  1994    URETEROSCOPY LASER LITHOTRIPSY WITH STENT INSERTION Left 3/9/2022    Procedure: URETEROSCOPY LASER LITHOTRIPSY WITH STENT INSERTION;  Surgeon: Troy Torres MD;  Location: [facility];  Service: Urology;  Laterality: Left;         FAMILY HISTORY  Family History   Problem Relation Age of Onset    Coronary artery disease Father     Hypertension Father     Melanoma Father     Stroke Maternal Grandmother     Breast cancer Maternal Aunt     Cervical cancer Maternal Aunt     Liver cancer Paternal Aunt     Inflammatory bowel disease Paternal Aunt     Liver cancer Paternal Grandfather     Cirrhosis Maternal Uncle         alcohol induced    Liver cancer Paternal Uncle     Colon cancer Neg Hx     Ovarian cancer Neg Hx          SOCIAL HISTORY  Social History     Socioeconomic History    Marital status:    Tobacco Use    Smoking status: Former     Current packs/day: 0.25     Average packs/day: 0.3 packs/day for 28.1 years (7.0 ttl pk-yrs)     Types: Cigarettes     Start date: 1997    Smokeless tobacco: Never   Vaping Use    Vaping status: Never Used   Substance and Sexual Activity    Alcohol use: Yes     Comment: social    Drug use: Never    Sexual activity: Yes     Partners: Male     Birth control/protection: Tubal ligation         ALLERGIES  Latex        REVIEW OF SYSTEMS  As noted in HPI      PHYSICAL EXAM    I have reviewed the triage vital signs and nursing notes.    ED Triage Vitals [02/19/25 0643]   Temp Heart Rate Resp BP SpO2   97.7 °F (36.5 °C) 71 16 146/82 99 %      Temp src Heart Rate Source Patient Position BP Location FiO2 (%)   Oral Monitor Sitting Left arm --       Physical Exam  Constitutional:       General: She is not in acute distress.  HENT:      Head: Atraumatic.   Cardiovascular:      Rate and Rhythm: Normal rate and regular rhythm.      Pulses: Normal pulses.   Pulmonary:      Effort: Pulmonary effort is normal. No respiratory  distress.      Breath sounds: No wheezing or rales.   Abdominal:      Palpations: Abdomen is soft.      Tenderness: There is abdominal tenderness in the left upper quadrant. There is left CVA tenderness.   Musculoskeletal:      Cervical back: Neck supple.      Right lower leg: No edema.      Left lower leg: No edema.   Neurological:      General: No focal deficit present.      Mental Status: She is alert.       LAB RESULTS  Recent Results (from the past 24 hours)   Comprehensive Metabolic Panel    Collection Time: 02/19/25  6:58 AM    Specimen: Blood   Result Value Ref Range    Glucose 104 (H) 65 - 99 mg/dL    BUN 16 6 - 20 mg/dL    Creatinine 0.85 0.57 - 1.00 mg/dL    Sodium 138 136 - 145 mmol/L    Potassium 3.8 3.5 - 5.2 mmol/L    Chloride 102 98 - 107 mmol/L    CO2 22.5 22.0 - 29.0 mmol/L    Calcium 8.8 8.6 - 10.5 mg/dL    Total Protein 6.9 6.0 - 8.5 g/dL    Albumin 3.9 3.5 - 5.2 g/dL    ALT (SGPT) 16 1 - 33 U/L    AST (SGOT) 24 1 - 32 U/L    Alkaline Phosphatase 109 39 - 117 U/L    Total Bilirubin 0.2 0.0 - 1.2 mg/dL    Globulin 3.0 gm/dL    A/G Ratio 1.3 g/dL    BUN/Creatinine Ratio 18.8 7.0 - 25.0    Anion Gap 13.5 5.0 - 15.0 mmol/L    eGFR 82.6 >60.0 mL/min/1.73   High Sensitivity Troponin T    Collection Time: 02/19/25  6:58 AM    Specimen: Blood   Result Value Ref Range    HS Troponin T <6 <14 ng/L   Green Top (Gel)    Collection Time: 02/19/25  6:58 AM   Result Value Ref Range    Extra Tube Hold for add-ons.    Lavender Top    Collection Time: 02/19/25  6:58 AM   Result Value Ref Range    Extra Tube hold for add-on    Gold Top - SST    Collection Time: 02/19/25  6:58 AM   Result Value Ref Range    Extra Tube Hold for add-ons.    Light Blue Top    Collection Time: 02/19/25  6:58 AM   Result Value Ref Range    Extra Tube Hold for add-ons.    CBC Auto Differential    Collection Time: 02/19/25  6:58 AM    Specimen: Blood   Result Value Ref Range    WBC 8.56 3.40 - 10.80 10*3/mm3    RBC 4.95 3.77 - 5.28  10*6/mm3    Hemoglobin 13.5 12.0 - 15.9 g/dL    Hematocrit 41.3 34.0 - 46.6 %    MCV 83.4 79.0 - 97.0 fL    MCH 27.3 26.6 - 33.0 pg    MCHC 32.7 31.5 - 35.7 g/dL    RDW 13.6 12.3 - 15.4 %    RDW-SD 41.5 37.0 - 54.0 fl    MPV 9.5 6.0 - 12.0 fL    Platelets 286 140 - 450 10*3/mm3    Neutrophil % 48.9 42.7 - 76.0 %    Lymphocyte % 37.3 19.6 - 45.3 %    Monocyte % 6.7 5.0 - 12.0 %    Eosinophil % 5.3 0.3 - 6.2 %    Basophil % 1.2 0.0 - 1.5 %    Immature Grans % 0.6 (H) 0.0 - 0.5 %    Neutrophils, Absolute 4.20 1.70 - 7.00 10*3/mm3    Lymphocytes, Absolute 3.19 (H) 0.70 - 3.10 10*3/mm3    Monocytes, Absolute 0.57 0.10 - 0.90 10*3/mm3    Eosinophils, Absolute 0.45 (H) 0.00 - 0.40 10*3/mm3    Basophils, Absolute 0.10 0.00 - 0.20 10*3/mm3    Immature Grans, Absolute 0.05 0.00 - 0.05 10*3/mm3    nRBC 0.0 0.0 - 0.2 /100 WBC   D-dimer, Quantitative    Collection Time: 02/19/25  6:58 AM    Specimen: Blood   Result Value Ref Range    D-Dimer, Quantitative <0.27 0.00 - 0.52 MCGFEU/mL   Lipase    Collection Time: 02/19/25  6:58 AM    Specimen: Blood   Result Value Ref Range    Lipase 40 13 - 60 U/L   High Sensitivity Troponin T 1Hr    Collection Time: 02/19/25  8:52 AM    Specimen: Blood   Result Value Ref Range    HS Troponin T <6 <14 ng/L    Troponin T Numeric Delta     Urinalysis With Culture If Indicated - Urine, Clean Catch    Collection Time: 02/19/25 10:29 AM    Specimen: Urine, Clean Catch   Result Value Ref Range    Color, UA Dark Yellow (A) Yellow, Straw    Appearance, UA Clear Clear    pH, UA 6.5 5.0 - 8.0    Specific Gravity, UA 1.025 1.005 - 1.030    Glucose, UA Negative Negative    Ketones, UA Negative Negative    Bilirubin, UA Negative Negative    Blood, UA Negative Negative    Protein, UA Negative Negative    Leuk Esterase, UA Trace (A) Negative    Nitrite, UA Positive (A) Negative    Urobilinogen, UA 1.0 E.U./dL 0.2 - 1.0 E.U./dL   Urinalysis, Microscopic Only - Urine, Clean Catch    Collection Time: 02/19/25  10:29 AM    Specimen: Urine, Clean Catch   Result Value Ref Range    RBC, UA None Seen None Seen, 0-2 /HPF    WBC, UA 0-2 None Seen, 0-2 /HPF    Bacteria, UA Trace (A) None Seen /HPF    Squamous Epithelial Cells, UA 0-2 None Seen, 0-2 /HPF    Hyaline Casts, UA None Seen None Seen /LPF    Methodology Manual Light Microscopy        If labs were ordered, I independently reviewed the results and considered them in treating the patient.        RADIOLOGY  XR Chest 1 View    Result Date: 2/19/2025  PROCEDURE: XR CHEST 1 VW-  HISTORY: Chest Pain Triage Protocol  COMPARISON: 9/2/2023.  FINDINGS: The heart is normal in size. The mediastinum is unremarkable. The lungs are clear. There is no pneumothorax.  There are no acute osseous abnormalities.      No acute cardiopulmonary process.      Images were reviewed, interpreted, and dictated by Dr. Travis Garcia MD Transcribed by Braden Deluna PA-C.  This report was signed and finalized on 2/19/2025 9:14 AM by Travis Garcia MD.      CT Abdomen Pelvis With Contrast    Result Date: 2/19/2025  PROCEDURE: CT ABDOMEN PELVIS W CONTRAST-  TECHNIQUE: IV contrast enhanced exam  HISTORY: Left-sided chest pleuritic pain, left flank pain, dysuria, evaluate PE, evaluate obstructing kidney stone  COMPARISON: 3/8/2022.  FINDINGS:  ABDOMEN: Solitary left kidney is present with compensatory hypertrophy. Previously noted obstructing stone disease is no longer evident. There is a nonobstructing stone of the upper pole left kidney.  Remaining solid organs are normal. Cholelithiasis is present. There is no evidence of acute gallbladder disease.  No bowel obstruction or bowel wall thickening is seen. There is no free fluid or free air.  PELVIS: Pelvic bowel loops are unremarkable. Uterus and ovaries are normal. Bladder has a normal appearance. There is no free fluid.      1. Cholelithiasis 2. Nonobstructing left renal stone without hydronephrosis or active obstruction   This study was performed with  techniques to keep radiation doses as low as reasonably achievable (ALARA). Individualized dose reduction techniques using automated exposure control or adjustment of vA and/or kV according to the patient size were employed.  This report was signed and finalized on 2/19/2025 8:57 AM by Travis Garcia MD.      CT Angiogram Chest Pulmonary Embolism    Result Date: 2/19/2025  PROCEDURE: CT ANGIOGRAM CHEST PULMONARY EMBOLISM-  HISTORY: Left-sided chest pleuritic pain, left flank pain, dysuria, evaluate PE, evaluate obstructing kidney stone  TECHNIQUE: Thin section axial CT with IV contrast supplemented with 3D reconstructed MIP images.  FINDINGS:  Pulmonary vessels enhance in a normal fashion without evidence of embolic disease. Thoracic aorta is normal in caliber without evidence of aneurysm or dissection.  No acute lung disease is present.  No pleural or pericardial effusion is seen. No adenopathy or mass lesion is present.      1. No evidence of pulmonary embolism.  2. No acute lung disease    This study was performed with techniques to keep radiation doses as low as reasonably achievable (ALARA). Individualized dose reduction techniques using automated exposure control or adjustment of vA and/or kV according to the patient size were employed.  This report was signed and finalized on 2/19/2025 8:54 AM by Travis Garcia MD.       PROCEDURES    Procedures    ECG 12 Lead ED Triage Standing Order; Chest Pain   Final Result          MEDICATIONS GIVEN IN ER    Medications   aspirin tablet 325 mg (325 mg Oral Given 2/19/25 0717)   ketorolac (TORADOL) injection 15 mg (15 mg Intravenous Given 2/19/25 0855)   pantoprazole (PROTONIX) injection 40 mg (40 mg Intravenous Given 2/19/25 0853)   metoclopramide (REGLAN) injection 5 mg (5 mg Intravenous Given 2/19/25 0859)   iopamidol (ISOVUE-300) 61 % injection 100 mL (100 mL Intravenous Given 2/19/25 0822)   diphenhydrAMINE (BENADRYL) injection 12.5 mg (12.5 mg Intravenous Given  2/19/25 0857)         MEDICAL DECISION MAKING, PROGRESS, and CONSULTS    All labs, if obtained, have been independently reviewed by me.  All radiology studies, if obtained, have been reviewed by me and the radiologist dictating the report.  All EKG's, if obtained, have been independently viewed and interpreted by me.      Discussion below represents my analysis of pertinent findings related to patient's condition, differential diagnosis, treatment plan and final disposition.                         Differential diagnosis:    GABY, electrolyte derangement, gastritis, kidney stone, UTI, others.      Additional sources:    - Discussed/ obtained information from independent historians:      - External (non-ED) record review: Progress note 9/12/2024 OB/GYN    - Chronic or social conditions impacting care:      - Shared decision making:        Orders placed during this visit:  Orders Placed This Encounter   Procedures    XR Chest 1 View    CT Angiogram Chest Pulmonary Embolism    CT Abdomen Pelvis With Contrast    [place] Draw    Comprehensive Metabolic Panel    High Sensitivity Troponin T    CBC Auto Differential    D-dimer, Quantitative    High Sensitivity Troponin T 1Hr    Lipase    Urinalysis With Culture If Indicated - Urine, Clean Catch    Urinalysis, Microscopic Only - Urine, Clean Catch    Undress & Gown    Continuous Pulse Oximetry    ECG 12 Lead ED Triage Standing Order; Chest Pain    CBC & Differential    Green Top (Gel)    Lavender Top    Gold Top - SST    Light Blue Top         Additional orders considered but not ordered:      ED Course/MDM Discussion:    Patient is a 54-year-old female who presented for left upper quadrant abdominal pain flank pain and with deep inspiration    Patient nontoxic-appearing on presentation.  Her vital signs are reassuring.  She is afebrile.  No evidence of zoster rash on examination.    EKG is reassuring troponin undetectable at 0 and 1 hour NSTEMI has been excluded.  Atypical  symptoms for ACS.  Consider VTE CTA of the chest obtained showed no large PE on my interpretation of imaging.  Labs are grossly benign no leukocytosis no major electrolyte derangement no GABY no evidence for pancreatitis.  CT abdomen pelvis demonstrated left-sided nephrolithiasis without obstruction.  No hematuria on urinalysis.  She does have nitrite positive urine with trace leuks and few bacteria WBCs.  Given patient's associated dysuria we will treat for UTI.  She is given symptomatic management here in the emergency department.  Abdomen nonsurgical no other acute findings on imaging.  Recommended continue symptomatic management, antibiotic for UTI, close outpatient follow-up with strict return precautions.  Patient agreeable to plan.  Stable for discharge.        ED Course as of 02/19/25 1509   Wed Feb 19, 2025   0822 EKG demonstrates sinus rhythm at a rate of 62 with normal axis and intervals no acute ST elevations on my interpretation [DW]      ED Course User Index  [DW] Berlin Miguel MD              Consultants:      Shared Decision Making:  After my consideration of clinical presentation and any laboratory/radiology studies obtained, I discussed the findings with the patient/patient representative who is in agreement with the treatment plan and the final disposition.   Risks and benefits of discharge and/or observation/admission were discussed.       AS OF 15:09 EST VITALS:    BP - 128/83  HR - 70  TEMP - 97.7 °F (36.5 °C) (Oral)  O2 SATS - 96%                  DIAGNOSIS  Final diagnoses:   Left upper quadrant abdominal pain   Left flank pain   Left nephrolithiasis   Urinary tract infection without hematuria, site unspecified         DISPOSITION  ED Disposition       ED Disposition   Discharge    Condition   Stable    Comment   --                   Please note that portions of this document were completed with voice recognition software.        Berlin Miguel MD  02/19/25 9824

## 2025-02-28 ENCOUNTER — HOSPITAL ENCOUNTER (OUTPATIENT)
Dept: MAMMOGRAPHY | Facility: HOSPITAL | Age: 55
Discharge: HOME | End: 2025-02-28
Payer: MEDICARE

## 2025-02-28 ENCOUNTER — HOSPITAL ENCOUNTER (OUTPATIENT)
Dept: ULTRASOUND IMAGING | Facility: HOSPITAL | Age: 55
Discharge: HOME | End: 2025-02-28
Payer: MEDICARE

## 2025-02-28 DIAGNOSIS — R92.8 ABNORMAL MAMMOGRAM: ICD-10-CM

## 2025-02-28 PROCEDURE — 76642 ULTRASOUND BREAST LIMITED: CPT

## 2025-02-28 PROCEDURE — 77065 DX MAMMO INCL CAD UNI: CPT

## 2025-02-28 PROCEDURE — G0279 TOMOSYNTHESIS, MAMMO: HCPCS

## 2025-05-15 ENCOUNTER — HOSPITAL ENCOUNTER (EMERGENCY)
Facility: HOSPITAL | Age: 55
Discharge: HOME | End: 2025-05-15
Attending: EMERGENCY MEDICINE
Payer: MEDICARE

## 2025-05-15 ENCOUNTER — APPOINTMENT (OUTPATIENT)
Dept: GENERAL RADIOLOGY | Facility: HOSPITAL | Age: 55
End: 2025-05-15
Payer: MEDICARE

## 2025-05-15 VITALS
RESPIRATION RATE: 18 BRPM | BODY MASS INDEX: 32.95 KG/M2 | HEIGHT: 64 IN | OXYGEN SATURATION: 100 % | SYSTOLIC BLOOD PRESSURE: 131 MMHG | HEART RATE: 59 BPM | TEMPERATURE: 97.6 F | DIASTOLIC BLOOD PRESSURE: 79 MMHG | WEIGHT: 193 LBS

## 2025-05-15 DIAGNOSIS — R07.89 CHEST WALL PAIN: Primary | ICD-10-CM

## 2025-05-15 LAB
ALBUMIN SERPL-MCNC: 3.9 G/DL (ref 3.5–5.2)
ALBUMIN/GLOB SERPL: 1.4 G/DL
ALP SERPL-CCNC: 97 U/L (ref 39–117)
ALT SERPL W P-5'-P-CCNC: 12 U/L (ref 1–33)
ANION GAP SERPL CALCULATED.3IONS-SCNC: 9.9 MMOL/L (ref 5–15)
AST SERPL-CCNC: 21 U/L (ref 1–32)
BASOPHILS # BLD AUTO: 0.09 10*3/MM3 (ref 0–0.2)
BASOPHILS NFR BLD AUTO: 1.3 % (ref 0–1.5)
BILIRUB SERPL-MCNC: 0.4 MG/DL (ref 0–1.2)
BUN SERPL-MCNC: 16 MG/DL (ref 6–20)
BUN/CREAT SERPL: 19 (ref 7–25)
CALCIUM SPEC-SCNC: 8.7 MG/DL (ref 8.6–10.5)
CHLORIDE SERPL-SCNC: 102 MMOL/L (ref 98–107)
CO2 SERPL-SCNC: 23.1 MMOL/L (ref 22–29)
CREAT SERPL-MCNC: 0.84 MG/DL (ref 0.57–1)
D DIMER PPP FEU-MCNC: <0.27 MCGFEU/ML (ref 0–0.52)
DEPRECATED RDW RBC AUTO: 42.2 FL (ref 37–54)
EGFRCR SERPLBLD CKD-EPI 2021: 83.7 ML/MIN/1.73
EOSINOPHIL # BLD AUTO: 0.49 10*3/MM3 (ref 0–0.4)
EOSINOPHIL NFR BLD AUTO: 6.9 % (ref 0.3–6.2)
ERYTHROCYTE [DISTWIDTH] IN BLOOD BY AUTOMATED COUNT: 13.7 % (ref 12.3–15.4)
GEN 5 1HR TROPONIN T REFLEX: <6 NG/L
GLOBULIN UR ELPH-MCNC: 2.7 GM/DL
GLUCOSE SERPL-MCNC: 110 MG/DL (ref 65–99)
HCT VFR BLD AUTO: 40.4 % (ref 34–46.6)
HGB BLD-MCNC: 13.4 G/DL (ref 12–15.9)
HOLD SPECIMEN: NORMAL
HOLD SPECIMEN: NORMAL
IMM GRANULOCYTES # BLD AUTO: 0.01 10*3/MM3 (ref 0–0.05)
IMM GRANULOCYTES NFR BLD AUTO: 0.1 % (ref 0–0.5)
LYMPHOCYTES # BLD AUTO: 2.14 10*3/MM3 (ref 0.7–3.1)
LYMPHOCYTES NFR BLD AUTO: 30.2 % (ref 19.6–45.3)
MCH RBC QN AUTO: 27.8 PG (ref 26.6–33)
MCHC RBC AUTO-ENTMCNC: 33.2 G/DL (ref 31.5–35.7)
MCV RBC AUTO: 83.8 FL (ref 79–97)
MONOCYTES # BLD AUTO: 0.48 10*3/MM3 (ref 0.1–0.9)
MONOCYTES NFR BLD AUTO: 6.8 % (ref 5–12)
NEUTROPHILS NFR BLD AUTO: 3.87 10*3/MM3 (ref 1.7–7)
NEUTROPHILS NFR BLD AUTO: 54.7 % (ref 42.7–76)
NRBC BLD AUTO-RTO: 0 /100 WBC (ref 0–0.2)
PLATELET # BLD AUTO: 261 10*3/MM3 (ref 140–450)
PMV BLD AUTO: 9.2 FL (ref 6–12)
POTASSIUM SERPL-SCNC: 3.9 MMOL/L (ref 3.5–5.2)
PROT SERPL-MCNC: 6.6 G/DL (ref 6–8.5)
RBC # BLD AUTO: 4.82 10*6/MM3 (ref 3.77–5.28)
SODIUM SERPL-SCNC: 135 MMOL/L (ref 136–145)
TROPONIN T NUMERIC DELTA: NORMAL
TROPONIN T SERPL HS-MCNC: <6 NG/L
WBC NRBC COR # BLD AUTO: 7.08 10*3/MM3 (ref 3.4–10.8)
WHOLE BLOOD HOLD COAG: NORMAL
WHOLE BLOOD HOLD SPECIMEN: NORMAL

## 2025-05-15 PROCEDURE — 85025 COMPLETE CBC W/AUTO DIFF WBC: CPT

## 2025-05-15 PROCEDURE — 36415 COLL VENOUS BLD VENIPUNCTURE: CPT

## 2025-05-15 PROCEDURE — 85379 FIBRIN DEGRADATION QUANT: CPT | Performed by: EMERGENCY MEDICINE

## 2025-05-15 PROCEDURE — 99284 EMERGENCY DEPT VISIT MOD MDM: CPT | Performed by: EMERGENCY MEDICINE

## 2025-05-15 PROCEDURE — 93005 ELECTROCARDIOGRAM TRACING: CPT

## 2025-05-15 PROCEDURE — 71045 X-RAY EXAM CHEST 1 VIEW: CPT

## 2025-05-15 PROCEDURE — 25010000002 KETOROLAC TROMETHAMINE PER 15 MG: Performed by: EMERGENCY MEDICINE

## 2025-05-15 PROCEDURE — 80053 COMPREHEN METABOLIC PANEL: CPT

## 2025-05-15 PROCEDURE — 84484 ASSAY OF TROPONIN QUANT: CPT

## 2025-05-15 PROCEDURE — 84484 ASSAY OF TROPONIN QUANT: CPT | Performed by: EMERGENCY MEDICINE

## 2025-05-15 PROCEDURE — 93005 ELECTROCARDIOGRAM TRACING: CPT | Performed by: EMERGENCY MEDICINE

## 2025-05-15 PROCEDURE — 96374 THER/PROPH/DIAG INJ IV PUSH: CPT

## 2025-05-15 RX ORDER — ASPIRIN 325 MG
325 TABLET ORAL ONCE
Status: COMPLETED | OUTPATIENT
Start: 2025-05-15 | End: 2025-05-15

## 2025-05-15 RX ORDER — SODIUM CHLORIDE 0.9 % (FLUSH) 0.9 %
10 SYRINGE (ML) INJECTION AS NEEDED
Status: DISCONTINUED | OUTPATIENT
Start: 2025-05-15 | End: 2025-05-15 | Stop reason: HOSPADM

## 2025-05-15 RX ORDER — KETOROLAC TROMETHAMINE 30 MG/ML
15 INJECTION, SOLUTION INTRAMUSCULAR; INTRAVENOUS ONCE
Status: COMPLETED | OUTPATIENT
Start: 2025-05-15 | End: 2025-05-15

## 2025-05-15 RX ORDER — LIDOCAINE 50 MG/G
1 PATCH TOPICAL EVERY 24 HOURS
Qty: 15 EACH | Refills: 0 | Status: SHIPPED | OUTPATIENT
Start: 2025-05-15

## 2025-05-15 RX ADMIN — ASPIRIN 325 MG: 325 TABLET ORAL at 14:40

## 2025-05-15 RX ADMIN — KETOROLAC TROMETHAMINE 15 MG: 30 INJECTION, SOLUTION INTRAMUSCULAR; INTRAVENOUS at 15:04

## 2025-05-15 NOTE — ED PROVIDER NOTES
EMERGENCY DEPARTMENT ENCOUNTER    Pt Name: Abby Antoine  MRN: [id number]  Pt :   1970  Room Number:    Date of encounter:  5/15/2025  PCP: Brittany Juan APRN  ED Provider: Myron Salas MD    Historian: Patient      HPI:  Chief Complaint   Patient presents with    Chest Pain          Context: Abby Antoine is a 54 y.o. female who presents to the ED c/o chest pain, located left-sided chest, worse with movement, deep breathing, present for 3 days.  Has had pleurisy before but reports that felt different.  Denies fevers, cough, shortness of breath, nausea, vomiting.  No trauma to the chest.      PAST MEDICAL HISTORY  Past Medical History:   Diagnosis Date    Abnormal Pap smear of cervix     Acid reflux     Anemia     Anxiety     Back pain     Bipolar disorder     CHF (congestive heart failure) 2017    Deafness     Depression     Fibromyalgia     Folic acid deficiency     Gout     Headache     History of blood transfusion     at birth    HTN (hypertension)     Hyperlipidemia     Insomnia     Iron deficiency     Kidney stone     Liver disease     Migraine     Ovarian cyst     Panic attack     PMS (premenstrual syndrome)     RA (rheumatoid arthritis)     RLS (restless legs syndrome)     Seasonal allergies     Sinus problem     Sleep apnea     Tattoos     Trauma     Urinary tract infection     Vision problems     Vitamin B12 deficiency          PAST SURGICAL HISTORY  Past Surgical History:   Procedure Laterality Date    CERVIX LESION DESTRUCTION      COLONOSCOPY  8- years or so    [facility]    TUBAL ABDOMINAL LIGATION      URETEROSCOPY LASER LITHOTRIPSY WITH STENT INSERTION Left 3/9/2022    Procedure: URETEROSCOPY LASER LITHOTRIPSY WITH STENT INSERTION;  Surgeon: Troy Torres MD;  Location: [facility];  Service: Urology;  Laterality: Left;         FAMILY HISTORY  Family History   Problem Relation Age of Onset    Coronary artery disease Father     Hypertension Father      Melanoma Father     Stroke Maternal Grandmother     Breast cancer Maternal Aunt     Cervical cancer Maternal Aunt     Liver cancer Paternal Aunt     Inflammatory bowel disease Paternal Aunt     Liver cancer Paternal Grandfather     Cirrhosis Maternal Uncle         alcohol induced    Liver cancer Paternal Uncle     Colon cancer Neg Hx     Ovarian cancer Neg Hx          SOCIAL HISTORY  Social History     Socioeconomic History    Marital status:    Tobacco Use    Smoking status: Former     Current packs/day: 0.25     Average packs/day: 0.3 packs/day for 28.4 years (7.1 ttl pk-yrs)     Types: Cigarettes     Start date: 1997    Smokeless tobacco: Never   Vaping Use    Vaping status: Never Used   Substance and Sexual Activity    Alcohol use: Yes     Comment: social    Drug use: Never    Sexual activity: Yes     Partners: Male     Birth control/protection: Tubal ligation         ALLERGIES  Latex        REVIEW OF SYSTEMS  Review of Systems   Constitutional:  Negative for chills and fever.   HENT:  Negative for sore throat and trouble swallowing.    Eyes:  Negative for pain and redness.   Respiratory:  Negative for cough and shortness of breath.    Cardiovascular:  Positive for chest pain. Negative for leg swelling.   Gastrointestinal:  Negative for abdominal pain, nausea and vomiting.   Genitourinary:  Negative for dysuria and urgency.   Musculoskeletal:  Negative for back pain and neck pain.   Skin:  Negative for rash and wound.   Neurological:  Negative for dizziness and weakness.        All systems reviewed and negative except for those discussed in HPI.       PHYSICAL EXAM    I have reviewed the triage vital signs and nursing notes.    ED Triage Vitals [05/15/25 1322]   Temp Heart Rate Resp BP SpO2   97.6 °F (36.4 °C) 74 18 144/83 96 %      Temp src Heart Rate Source Patient Position BP Location FiO2 (%)   Oral Monitor Sitting Left arm --       Physical Exam  Constitutional:       Appearance: Normal  appearance. She is not ill-appearing.   HENT:      Head: Normocephalic and atraumatic.      Right Ear: External ear normal.      Left Ear: External ear normal.      Nose: Nose normal.      Mouth/Throat:      Mouth: Mucous membranes are moist.      Pharynx: Oropharynx is clear.   Eyes:      Extraocular Movements: Extraocular movements intact.      Conjunctiva/sclera: Conjunctivae normal.      Pupils: Pupils are equal, round, and reactive to light.   Cardiovascular:      Rate and Rhythm: Normal rate and regular rhythm.      Pulses:           Radial pulses are 2+ on the right side and 2+ on the left side.   Pulmonary:      Effort: Pulmonary effort is normal.      Breath sounds: Normal breath sounds.   Chest:      Chest wall: Tenderness present.   Abdominal:      General: There is no distension.      Palpations: Abdomen is soft.      Tenderness: There is no abdominal tenderness.   Musculoskeletal:         General: No tenderness or deformity. Normal range of motion.      Cervical back: Normal range of motion and neck supple.      Right lower leg: No edema.      Left lower leg: No edema.        Legs:    Skin:     General: Skin is warm and dry.      Capillary Refill: Capillary refill takes less than 2 seconds.   Neurological:      General: No focal deficit present.      Mental Status: She is alert and oriented to person, place, and time.            LAB RESULTS  Recent Results (from the past 24 hours)   Comprehensive Metabolic Panel    Collection Time: 05/15/25  1:36 PM    Specimen: Blood   Result Value Ref Range    Glucose 110 (H) 65 - 99 mg/dL    BUN 16 6 - 20 mg/dL    Creatinine 0.84 0.57 - 1.00 mg/dL    Sodium 135 (L) 136 - 145 mmol/L    Potassium 3.9 3.5 - 5.2 mmol/L    Chloride 102 98 - 107 mmol/L    CO2 23.1 22.0 - 29.0 mmol/L    Calcium 8.7 8.6 - 10.5 mg/dL    Total Protein 6.6 6.0 - 8.5 g/dL    Albumin 3.9 3.5 - 5.2 g/dL    ALT (SGPT) 12 1 - 33 U/L    AST (SGOT) 21 1 - 32 U/L    Alkaline Phosphatase 97 39 - 117 U/L     Total Bilirubin 0.4 0.0 - 1.2 mg/dL    Globulin 2.7 gm/dL    A/G Ratio 1.4 g/dL    BUN/Creatinine Ratio 19.0 7.0 - 25.0    Anion Gap 9.9 5.0 - 15.0 mmol/L    eGFR 83.7 >60.0 mL/min/1.73   High Sensitivity Troponin T    Collection Time: 05/15/25  1:36 PM    Specimen: Blood   Result Value Ref Range    HS Troponin T <6 <14 ng/L   Green Top (Gel)    Collection Time: 05/15/25  1:36 PM   Result Value Ref Range    Extra Tube Hold for add-ons.    Lavender Top    Collection Time: 05/15/25  1:36 PM   Result Value Ref Range    Extra Tube hold for add-on    Gold Top - SST    Collection Time: 05/15/25  1:36 PM   Result Value Ref Range    Extra Tube Hold for add-ons.    Light Blue Top    Collection Time: 05/15/25  1:36 PM   Result Value Ref Range    Extra Tube Hold for add-ons.    CBC Auto Differential    Collection Time: 05/15/25  1:36 PM    Specimen: Blood   Result Value Ref Range    WBC 7.08 3.40 - 10.80 10*3/mm3    RBC 4.82 3.77 - 5.28 10*6/mm3    Hemoglobin 13.4 12.0 - 15.9 g/dL    Hematocrit 40.4 34.0 - 46.6 %    MCV 83.8 79.0 - 97.0 fL    MCH 27.8 26.6 - 33.0 pg    MCHC 33.2 31.5 - 35.7 g/dL    RDW 13.7 12.3 - 15.4 %    RDW-SD 42.2 37.0 - 54.0 fl    MPV 9.2 6.0 - 12.0 fL    Platelets 261 140 - 450 10*3/mm3    Neutrophil % 54.7 42.7 - 76.0 %    Lymphocyte % 30.2 19.6 - 45.3 %    Monocyte % 6.8 5.0 - 12.0 %    Eosinophil % 6.9 (H) 0.3 - 6.2 %    Basophil % 1.3 0.0 - 1.5 %    Immature Grans % 0.1 0.0 - 0.5 %    Neutrophils, Absolute 3.87 1.70 - 7.00 10*3/mm3    Lymphocytes, Absolute 2.14 0.70 - 3.10 10*3/mm3    Monocytes, Absolute 0.48 0.10 - 0.90 10*3/mm3    Eosinophils, Absolute 0.49 (H) 0.00 - 0.40 10*3/mm3    Basophils, Absolute 0.09 0.00 - 0.20 10*3/mm3    Immature Grans, Absolute 0.01 0.00 - 0.05 10*3/mm3    nRBC 0.0 0.0 - 0.2 /100 WBC   D-dimer, Quantitative    Collection Time: 05/15/25  1:36 PM    Specimen: Blood   Result Value Ref Range    D-Dimer, Quantitative <0.27 0.00 - 0.52 MCGFEU/mL   High Sensitivity  Troponin T 1Hr    Collection Time: 05/15/25  2:43 PM    Specimen: Blood   Result Value Ref Range    HS Troponin T <6 <14 ng/L    Troponin T Numeric Delta         If labs were ordered, I independently reviewed the results and considered them in treating the patient.        RADIOLOGY  XR Chest 1 View  Result Date: 5/15/2025  PROCEDURE: XR CHEST 1 VW-  HISTORY: Chest Pain Triage Protocol  COMPARISON: 2/19/2025.  FINDINGS: The heart is normal in size. The mediastinum is unremarkable. The lungs are clear. There is no pneumothorax.  There are no acute osseous abnormalities.      No acute cardiopulmonary process.       Images were reviewed, interpreted, and dictated by Dr. Travis Garcia MD Transcribed by Braden Deluna PA-C.  This report was signed and finalized on 5/15/2025 3:14 PM by Travis Garcia MD.            PROCEDURES    Procedures    Interpretations    O2 Sat: The patient's oxygen saturation was 96% on Room Air.  This was independently interpreted by me as Normal    EKG: I reviewed and independently interpreted the EKG as normal sinus rhythm at 70 bpm, normal axis, normal intervals, no ST elevation, no T wave inversion    Cardiac Monitoring: I reviewed and independently interpreted the Rhythm Strip as Normal Sinus rhythm rate of 74    Radiology: I ordered and independently reviewed the above noted radiographic studies.  I viewed images of Chest Xray which showed No pulmonary process per my independent interpretation. See radiologist's dictation for official interpretation.     HEART Score: 1       MEDICATIONS GIVEN IN ER    Medications   sodium chloride 0.9 % flush 10 mL (has no administration in time range)   aspirin tablet 325 mg (325 mg Oral Given 5/15/25 1440)   ketorolac (TORADOL) injection 15 mg (15 mg Intravenous Given 5/15/25 1504)         MEDICAL DECISION MAKING, PROGRESS, and CONSULTS    All labs, if obtained, have been independently reviewed by me.  All radiology studies, if obtained, have been reviewed  by me and the radiologist dictating the report.  All EKG's, if obtained, have been independently viewed and interpreted by me      Discussion below represents my analysis of pertinent findings related to patient's condition, differential diagnosis, treatment plan and final disposition.      Differential diagnosis:    54-year-old female presenting to the ED with complaint of chest pain, patient has reproducible pain to left-sided of the chest, could be ACS although seems less likely the patient is low risk heart score prior to troponin being drawn, she has a knot in a varicosity in her leg, I think it best that represents superficial thrombophlebitis, however given chest pain and possible DVT will obtain a D-dimer, will obtain troponin, EKG, chest x-ray    Additional Sources:  External (non-ED) record review:  ED note 2/19/2025 for chest and abdominal pain       Orders placed during this visit:  Orders Placed This Encounter   Procedures    XR Chest 1 View    [place] Draw    Comprehensive Metabolic Panel    High Sensitivity Troponin T    CBC Auto Differential    High Sensitivity Troponin T 1Hr    D-dimer, Quantitative    NPO Diet NPO Type: Strict NPO    Undress & Gown    Continuous Pulse Oximetry    Oxygen Therapy- Nasal Cannula; Titrate 1-6 LPM Per SpO2; 90 - 95%    ECG 12 Lead ED Triage Standing Order; Chest Pain    ECG 12 Lead ED Triage Standing Order; Chest Pain    Insert Peripheral IV    CBC & Differential    Green Top (Gel)    Lavender Top    Gold Top - SST    Light Blue Top         Additional orders considered but not ordered:  None    ED Course:    Consultants:  None    ED Course as of 05/15/25 1529   Thu May 15, 2025   1505 D-dimer, Quantitative  D-dimer negative, rules out PE in this low risk patient [CS]   1505 High Sensitivity Troponin T  Initial troponin level is negative, will repeat per high sensitivity troponin protocol [CS]   1505 Comprehensive Metabolic Panel(!)  CMP is benign [CS]   1505 CBC &  Differential(!)  CBC is benign [CS]   1512 High Sensitivity Troponin T 1Hr  Repeat troponin is similarly negative [CS]   1514 Patient has 2 negative troponins, negative D-dimer, resting comfortably, discussed that the pain may be coming from the chest wall, could be pleurisy but seems less likely, advised NSAIDs, lidocaine patches, follow-up with primary care.  Patient voiced understanding is agreeable with the plan for discharge home [CS]      ED Course User Index  [CS] Myron Salas MD           After my consideration of clinical presentation and any laboratory/radiology studies obtained, I discussed the findings with the patient/patient representative who is in agreement with the treatment plan and the final disposition. Risks and benefits of discharge were discussed.     AS OF 15:29 EDT VITALS:    BP - 131/79  HR - 59  TEMP - 97.6 °F (36.4 °C) (Oral)  O2 SATS - 100%    I reviewed the patient's prescription monitoring report if available prior to discharge    DIAGNOSIS  Final diagnoses:   Chest wall pain         DISPOSITION  ED Disposition       ED Disposition   Discharge    Condition   Stable    Comment   --                   Please note that portions of this document were completed with voice recognition software.        Myron Salas MD  05/15/25 2418

## 2025-08-15 ENCOUNTER — HOSPITAL ENCOUNTER (EMERGENCY)
Facility: HOSPITAL | Age: 55
Discharge: HOME | End: 2025-08-15
Attending: EMERGENCY MEDICINE
Payer: MEDICARE

## 2025-08-15 VITALS
DIASTOLIC BLOOD PRESSURE: 73 MMHG | WEIGHT: 198 LBS | HEART RATE: 68 BPM | OXYGEN SATURATION: 95 % | TEMPERATURE: 97.9 F | HEIGHT: 64 IN | SYSTOLIC BLOOD PRESSURE: 132 MMHG | RESPIRATION RATE: 18 BRPM | BODY MASS INDEX: 33.8 KG/M2

## 2025-08-15 DIAGNOSIS — M79.641 RIGHT HAND PAIN: Primary | ICD-10-CM

## 2025-08-15 PROCEDURE — 96372 THER/PROPH/DIAG INJ SC/IM: CPT

## 2025-08-15 PROCEDURE — 25010000002 DEXAMETHASONE SODIUM PHOSPHATE 10 MG/ML SOLUTION

## 2025-08-15 PROCEDURE — 99282 EMERGENCY DEPT VISIT SF MDM: CPT | Performed by: EMERGENCY MEDICINE

## 2025-08-15 PROCEDURE — 25010000002 KETOROLAC TROMETHAMINE PER 15 MG

## 2025-08-15 RX ORDER — DEXAMETHASONE SODIUM PHOSPHATE 10 MG/ML
10 INJECTION, SOLUTION INTRAMUSCULAR; INTRAVENOUS ONCE
Status: COMPLETED | OUTPATIENT
Start: 2025-08-15 | End: 2025-08-15

## 2025-08-15 RX ORDER — KETOROLAC TROMETHAMINE 30 MG/ML
30 INJECTION, SOLUTION INTRAMUSCULAR; INTRAVENOUS ONCE
Status: COMPLETED | OUTPATIENT
Start: 2025-08-15 | End: 2025-08-15

## 2025-08-15 RX ORDER — MELOXICAM 7.5 MG/1
15 TABLET ORAL DAILY
Qty: 14 TABLET | Refills: 0 | Status: SHIPPED | OUTPATIENT
Start: 2025-08-15 | End: 2025-08-22

## 2025-08-15 RX ADMIN — KETOROLAC TROMETHAMINE 30 MG: 30 INJECTION INTRAMUSCULAR; INTRAVENOUS at 13:18

## 2025-08-15 RX ADMIN — DEXAMETHASONE SODIUM PHOSPHATE 10 MG: 10 INJECTION, SOLUTION INTRAMUSCULAR; INTRAVENOUS at 13:18

## 2025-08-25 ENCOUNTER — TRANSCRIBE ORDERS (OUTPATIENT)
Dept: ADMINISTRATIVE | Facility: HOSPITAL | Age: 55
End: 2025-08-25
Payer: MEDICARE

## 2025-08-25 ENCOUNTER — OFFICE VISIT (OUTPATIENT)
Dept: ORTHOPEDIC SURGERY | Facility: CLINIC | Age: 55
End: 2025-08-25
Payer: MEDICARE

## 2025-08-25 VITALS
BODY MASS INDEX: 34.31 KG/M2 | WEIGHT: 201 LBS | DIASTOLIC BLOOD PRESSURE: 80 MMHG | SYSTOLIC BLOOD PRESSURE: 114 MMHG | HEIGHT: 64 IN

## 2025-08-25 DIAGNOSIS — R92.8 ABNORMAL MAMMOGRAM: Primary | ICD-10-CM

## 2025-08-25 DIAGNOSIS — G56.03 CARPAL TUNNEL SYNDROME, BILATERAL: Primary | ICD-10-CM

## 2025-08-25 PROCEDURE — 1160F RVW MEDS BY RX/DR IN RCRD: CPT | Performed by: PHYSICIAN ASSISTANT

## 2025-08-25 PROCEDURE — 1159F MED LIST DOCD IN RCRD: CPT | Performed by: PHYSICIAN ASSISTANT

## 2025-08-25 PROCEDURE — 73110 X-RAY EXAM OF WRIST: CPT | Performed by: PHYSICIAN ASSISTANT

## 2025-08-25 PROCEDURE — 99214 OFFICE O/P EST MOD 30 MIN: CPT | Performed by: PHYSICIAN ASSISTANT

## 2025-08-25 RX ORDER — SERTRALINE HYDROCHLORIDE 100 MG/1
100 TABLET, FILM COATED ORAL DAILY
COMMUNITY
Start: 2025-07-03

## 2025-08-25 RX ORDER — PROPRANOLOL HYDROCHLORIDE 10 MG/1
1 TABLET ORAL DAILY
COMMUNITY
Start: 2025-08-01

## 2025-08-25 RX ORDER — BUPROPION HYDROCHLORIDE 150 MG/1
TABLET ORAL
COMMUNITY
Start: 2025-08-22

## (undated) DEVICE — NITINOL WIRE WITH HYDROPHILIC TIP: Brand: SENSOR

## (undated) DEVICE — URETERAL ACCESS SHEATH SET: Brand: NAVIGATOR HD

## (undated) DEVICE — SLV SCD CALF HEMOFORCE DVT THERP REPROC MD

## (undated) DEVICE — DUAL LUMEN URETERAL CATHETER

## (undated) DEVICE — CATHETER,URETHRAL,REDRUBBER,STRL,18FR: Brand: MEDLINE

## (undated) DEVICE — GLV SURG SENSICARE LT W/ALOE PF LF 7 STRL

## (undated) DEVICE — ST FLD IRR WARM

## (undated) DEVICE — RICH CYSTO: Brand: MEDLINE INDUSTRIES, INC.

## (undated) DEVICE — ADAPT UROLOK

## (undated) DEVICE — Device

## (undated) DEVICE — SOL IRR NACL 0.9PCT 3000ML

## (undated) DEVICE — GW AMPLTZ SUPRSTF PTFE JB .035 7X145CM